# Patient Record
Sex: MALE | Race: WHITE | NOT HISPANIC OR LATINO
[De-identification: names, ages, dates, MRNs, and addresses within clinical notes are randomized per-mention and may not be internally consistent; named-entity substitution may affect disease eponyms.]

---

## 2020-01-04 ENCOUNTER — TRANSCRIPTION ENCOUNTER (OUTPATIENT)
Age: 45
End: 2020-01-04

## 2021-04-12 ENCOUNTER — INPATIENT (INPATIENT)
Facility: HOSPITAL | Age: 46
LOS: 3 days | Discharge: HOME HEALTH | DRG: 433 | End: 2021-04-16
Attending: STUDENT IN AN ORGANIZED HEALTH CARE EDUCATION/TRAINING PROGRAM
Payer: COMMERCIAL

## 2021-04-12 VITALS
DIASTOLIC BLOOD PRESSURE: 56 MMHG | SYSTOLIC BLOOD PRESSURE: 120 MMHG | TEMPERATURE: 99 F | RESPIRATION RATE: 22 BRPM | OXYGEN SATURATION: 94 % | HEART RATE: 92 BPM

## 2021-04-12 LAB
ALBUMIN FLD-MCNC: <0.2 G/DL — SIGNIFICANT CHANGE UP
ALBUMIN SERPL ELPH-MCNC: 2.4 G/DL — LOW (ref 3.4–5)
ALBUMIN SERPL ELPH-MCNC: 2.6 G/DL — LOW (ref 3.3–5)
ALP SERPL-CCNC: 152 U/L — HIGH (ref 40–120)
ALP SERPL-CCNC: 158 U/L — HIGH (ref 40–120)
ALT FLD-CCNC: 101 U/L — HIGH (ref 12–42)
ALT FLD-CCNC: 82 U/L — HIGH (ref 10–45)
AMMONIA BLD-MCNC: 65 UMOL/L — HIGH (ref 11–32)
AMPHET UR-MCNC: NEGATIVE — SIGNIFICANT CHANGE UP
AMYLASE P1 CFR SERPL: 46 U/L — SIGNIFICANT CHANGE UP (ref 25–115)
ANION GAP SERPL CALC-SCNC: 13 MMOL/L — SIGNIFICANT CHANGE UP (ref 5–17)
ANION GAP SERPL CALC-SCNC: 7 MMOL/L — LOW (ref 9–16)
ANISOCYTOSIS BLD QL: SLIGHT — SIGNIFICANT CHANGE UP
APAP SERPL-MCNC: <2 UG/ML — LOW (ref 10–30)
APPEARANCE UR: CLEAR — SIGNIFICANT CHANGE UP
APTT BLD: 39.2 SEC — HIGH (ref 27.5–35.5)
APTT BLD: 39.3 SEC — HIGH (ref 27.5–35.5)
AST SERPL-CCNC: 228 U/L — HIGH (ref 10–40)
AST SERPL-CCNC: 249 U/L — HIGH (ref 15–37)
B PERT IGG+IGM PNL SER: SIGNIFICANT CHANGE UP
BACTERIA # UR AUTO: PRESENT /HPF
BARBITURATES UR SCN-MCNC: NEGATIVE — SIGNIFICANT CHANGE UP
BASOPHILS # BLD AUTO: 0 K/UL — SIGNIFICANT CHANGE UP (ref 0–0.2)
BASOPHILS NFR BLD AUTO: 0 % — SIGNIFICANT CHANGE UP (ref 0–2)
BENZODIAZ UR-MCNC: NEGATIVE — SIGNIFICANT CHANGE UP
BILIRUB DIRECT SERPL-MCNC: 6.9 MG/DL — HIGH (ref 0–0.2)
BILIRUB SERPL-MCNC: 10.8 MG/DL — HIGH (ref 0.2–1.2)
BILIRUB SERPL-MCNC: 11 MG/DL — HIGH (ref 0.2–1.2)
BILIRUB UR-MCNC: ABNORMAL
BLD GP AB SCN SERPL QL: NEGATIVE — SIGNIFICANT CHANGE UP
BLD GP AB SCN SERPL QL: NEGATIVE — SIGNIFICANT CHANGE UP
BUN SERPL-MCNC: 11 MG/DL — SIGNIFICANT CHANGE UP (ref 7–23)
BUN SERPL-MCNC: 11 MG/DL — SIGNIFICANT CHANGE UP (ref 7–23)
CALCIUM SERPL-MCNC: 8.2 MG/DL — LOW (ref 8.4–10.5)
CALCIUM SERPL-MCNC: 8.3 MG/DL — LOW (ref 8.5–10.5)
CHLORIDE SERPL-SCNC: 87 MMOL/L — LOW (ref 96–108)
CHLORIDE SERPL-SCNC: 90 MMOL/L — LOW (ref 96–108)
CK SERPL-CCNC: 336 U/L — HIGH (ref 39–308)
CO2 SERPL-SCNC: 21 MMOL/L — LOW (ref 22–31)
CO2 SERPL-SCNC: 25 MMOL/L — SIGNIFICANT CHANGE UP (ref 22–31)
COCAINE METAB.OTHER UR-MCNC: NEGATIVE — SIGNIFICANT CHANGE UP
COLOR FLD: YELLOW — SIGNIFICANT CHANGE UP
COLOR SPEC: YELLOW — SIGNIFICANT CHANGE UP
COMMENT - FLUIDS: SIGNIFICANT CHANGE UP
CREAT ?TM UR-MCNC: 246 MG/DL — SIGNIFICANT CHANGE UP
CREAT SERPL-MCNC: 0.67 MG/DL — SIGNIFICANT CHANGE UP (ref 0.5–1.3)
CREAT SERPL-MCNC: 0.93 MG/DL — SIGNIFICANT CHANGE UP (ref 0.5–1.3)
DIFF PNL FLD: NEGATIVE — SIGNIFICANT CHANGE UP
EOSINOPHIL # BLD AUTO: 0 K/UL — SIGNIFICANT CHANGE UP (ref 0–0.5)
EOSINOPHIL NFR BLD AUTO: 0 % — SIGNIFICANT CHANGE UP (ref 0–6)
EPI CELLS # UR: SIGNIFICANT CHANGE UP /HPF (ref 0–5)
ETHANOL SERPL-MCNC: 228 MG/DL — HIGH
FLUID INTAKE SUBSTANCE CLASS: SIGNIFICANT CHANGE UP
FLUID SEGMENTED GRANULOCYTES: 10 % — SIGNIFICANT CHANGE UP
GLUCOSE BLDC GLUCOMTR-MCNC: 109 MG/DL — HIGH (ref 70–99)
GLUCOSE BLDC GLUCOMTR-MCNC: 94 MG/DL — SIGNIFICANT CHANGE UP (ref 70–99)
GLUCOSE SERPL-MCNC: 110 MG/DL — HIGH (ref 70–99)
GLUCOSE SERPL-MCNC: 124 MG/DL — HIGH (ref 70–99)
GLUCOSE UR QL: NEGATIVE — SIGNIFICANT CHANGE UP
GRAM STN FLD: SIGNIFICANT CHANGE UP
HCT VFR BLD CALC: 28.2 % — LOW (ref 39–50)
HCT VFR BLD CALC: 31.3 % — LOW (ref 39–50)
HCT VFR BLD CALC: 33 % — LOW (ref 39–50)
HGB BLD-MCNC: 10.2 G/DL — LOW (ref 13–17)
HGB BLD-MCNC: 11.2 G/DL — LOW (ref 13–17)
HGB BLD-MCNC: 11.9 G/DL — LOW (ref 13–17)
HYPOCHROMIA BLD QL: SLIGHT — SIGNIFICANT CHANGE UP
INR BLD: 1.93 — HIGH (ref 0.88–1.16)
INR BLD: 2.03 — HIGH (ref 0.88–1.16)
KETONES UR-MCNC: ABNORMAL MG/DL
LACTATE SERPL-SCNC: 3.5 MMOL/L — HIGH (ref 0.5–2)
LACTATE SERPL-SCNC: 4.7 MMOL/L — CRITICAL HIGH (ref 0.5–2)
LACTATE SERPL-SCNC: 6.3 MMOL/L — CRITICAL HIGH (ref 0.4–2)
LDH SERPL L TO P-CCNC: 46 U/L — SIGNIFICANT CHANGE UP
LDH SERPL L TO P-CCNC: 603 U/L — HIGH (ref 50–242)
LEUKOCYTE ESTERASE UR-ACNC: ABNORMAL
LIDOCAIN IGE QN: 418 U/L — HIGH (ref 73–393)
LYMPHOCYTES # BLD AUTO: 0.3 K/UL — LOW (ref 1–3.3)
LYMPHOCYTES # BLD AUTO: 1.8 % — LOW (ref 13–44)
LYMPHOCYTES # FLD: 29 % — SIGNIFICANT CHANGE UP
MACROCYTES BLD QL: SLIGHT — SIGNIFICANT CHANGE UP
MANUAL SMEAR VERIFICATION: SIGNIFICANT CHANGE UP
MCHC RBC-ENTMCNC: 32.8 PG — SIGNIFICANT CHANGE UP (ref 27–34)
MCHC RBC-ENTMCNC: 32.9 PG — SIGNIFICANT CHANGE UP (ref 27–34)
MCHC RBC-ENTMCNC: 33 PG — SIGNIFICANT CHANGE UP (ref 27–34)
MCHC RBC-ENTMCNC: 35.8 GM/DL — SIGNIFICANT CHANGE UP (ref 32–36)
MCHC RBC-ENTMCNC: 36.1 GM/DL — HIGH (ref 32–36)
MCHC RBC-ENTMCNC: 36.2 GM/DL — HIGH (ref 32–36)
MCV RBC AUTO: 90.7 FL — SIGNIFICANT CHANGE UP (ref 80–100)
MCV RBC AUTO: 91.4 FL — SIGNIFICANT CHANGE UP (ref 80–100)
MCV RBC AUTO: 92.1 FL — SIGNIFICANT CHANGE UP (ref 80–100)
MESOTHL CELL # FLD: 7 % — SIGNIFICANT CHANGE UP
METHADONE UR-MCNC: NEGATIVE — SIGNIFICANT CHANGE UP
MONOCYTES # BLD AUTO: 1.31 K/UL — HIGH (ref 0–0.9)
MONOCYTES NFR BLD AUTO: 7.9 % — SIGNIFICANT CHANGE UP (ref 2–14)
MONOS+MACROS # FLD: 27 % — SIGNIFICANT CHANGE UP
NEUTROPHILS # BLD AUTO: 14.92 K/UL — HIGH (ref 1.8–7.4)
NEUTROPHILS NFR BLD AUTO: 87.7 % — HIGH (ref 43–77)
NEUTS BAND # BLD: 2.6 % — SIGNIFICANT CHANGE UP (ref 0–8)
NITRITE UR-MCNC: POSITIVE
NRBC # BLD: 0 /100 WBCS — SIGNIFICANT CHANGE UP (ref 0–0)
NRBC # BLD: 0 /100 WBCS — SIGNIFICANT CHANGE UP (ref 0–0)
NT-PROBNP SERPL-SCNC: 48 PG/ML — SIGNIFICANT CHANGE UP
OB PNL STL: POSITIVE
OPIATES UR-MCNC: NEGATIVE — SIGNIFICANT CHANGE UP
OSMOLALITY UR: 588 MOSM/KG — SIGNIFICANT CHANGE UP (ref 300–900)
OVALOCYTES BLD QL SMEAR: SLIGHT — SIGNIFICANT CHANGE UP
PCP SPEC-MCNC: SIGNIFICANT CHANGE UP
PCP UR-MCNC: NEGATIVE — SIGNIFICANT CHANGE UP
PH UR: 6 — SIGNIFICANT CHANGE UP (ref 5–8)
PLAT MORPH BLD: NORMAL — SIGNIFICANT CHANGE UP
PLATELET # BLD AUTO: 102 K/UL — LOW (ref 150–400)
PLATELET # BLD AUTO: 131 K/UL — LOW (ref 150–400)
PLATELET # BLD AUTO: 141 K/UL — LOW (ref 150–400)
POLYCHROMASIA BLD QL SMEAR: SLIGHT — SIGNIFICANT CHANGE UP
POTASSIUM SERPL-MCNC: 4.3 MMOL/L — SIGNIFICANT CHANGE UP (ref 3.5–5.3)
POTASSIUM SERPL-MCNC: 4.4 MMOL/L — SIGNIFICANT CHANGE UP (ref 3.5–5.3)
POTASSIUM SERPL-SCNC: 4.3 MMOL/L — SIGNIFICANT CHANGE UP (ref 3.5–5.3)
POTASSIUM SERPL-SCNC: 4.4 MMOL/L — SIGNIFICANT CHANGE UP (ref 3.5–5.3)
PROT FLD-MCNC: 0.5 G/DL — SIGNIFICANT CHANGE UP
PROT SERPL-MCNC: 5.9 G/DL — LOW (ref 6–8.3)
PROT SERPL-MCNC: 6.4 G/DL — SIGNIFICANT CHANGE UP (ref 6.4–8.2)
PROT UR-MCNC: ABNORMAL MG/DL
PROTHROM AB SERPL-ACNC: 22.4 SEC — HIGH (ref 10.6–13.6)
PROTHROM AB SERPL-ACNC: 23.5 SEC — HIGH (ref 10.6–13.6)
RBC # BLD: 3.11 M/UL — LOW (ref 4.2–5.8)
RBC # BLD: 3.4 M/UL — LOW (ref 4.2–5.8)
RBC # BLD: 3.61 M/UL — LOW (ref 4.2–5.8)
RBC # FLD: 20.1 % — HIGH (ref 10.3–14.5)
RBC # FLD: 20.2 % — HIGH (ref 10.3–14.5)
RBC # FLD: 20.4 % — HIGH (ref 10.3–14.5)
RBC BLD AUTO: ABNORMAL
RCV VOL RI: 2000 /UL — HIGH (ref 0–0)
RH IG SCN BLD-IMP: POSITIVE — SIGNIFICANT CHANGE UP
RH IG SCN BLD-IMP: POSITIVE — SIGNIFICANT CHANGE UP
SALICYLATES SERPL-MCNC: <0.2 MG/DL — LOW (ref 2.8–20)
SARS-COV-2 RNA SPEC QL NAA+PROBE: SIGNIFICANT CHANGE UP
SMUDGE CELLS # BLD: PRESENT — SIGNIFICANT CHANGE UP
SODIUM SERPL-SCNC: 121 MMOL/L — LOW (ref 135–145)
SODIUM SERPL-SCNC: 122 MMOL/L — LOW (ref 132–145)
SODIUM UR-SCNC: 20 MMOL/L — SIGNIFICANT CHANGE UP
SP GR SPEC: 1.02 — SIGNIFICANT CHANGE UP (ref 1–1.03)
SPECIMEN SOURCE FLD: SIGNIFICANT CHANGE UP
SPECIMEN SOURCE: SIGNIFICANT CHANGE UP
TARGETS BLD QL SMEAR: SIGNIFICANT CHANGE UP
THC UR QL: NEGATIVE — SIGNIFICANT CHANGE UP
TOTAL NUCLEATED CELL COUNT, BODY FLUID: 73 /UL — SIGNIFICANT CHANGE UP
TROPONIN I SERPL-MCNC: <0.017 NG/ML — LOW (ref 0.02–0.06)
TUBE TYPE: SIGNIFICANT CHANGE UP
UROBILINOGEN FLD QL: 1 E.U./DL — SIGNIFICANT CHANGE UP
WBC # BLD: 14.16 K/UL — HIGH (ref 3.8–10.5)
WBC # BLD: 16.52 K/UL — HIGH (ref 3.8–10.5)
WBC # BLD: 17.35 K/UL — HIGH (ref 3.8–10.5)
WBC # FLD AUTO: 14.16 K/UL — HIGH (ref 3.8–10.5)
WBC # FLD AUTO: 16.52 K/UL — HIGH (ref 3.8–10.5)
WBC # FLD AUTO: 17.35 K/UL — HIGH (ref 3.8–10.5)
WBC UR QL: ABNORMAL /HPF

## 2021-04-12 PROCEDURE — 93010 ELECTROCARDIOGRAM REPORT: CPT

## 2021-04-12 PROCEDURE — 99222 1ST HOSP IP/OBS MODERATE 55: CPT

## 2021-04-12 PROCEDURE — 99291 CRITICAL CARE FIRST HOUR: CPT | Mod: 25

## 2021-04-12 PROCEDURE — 49082 ABD PARACENTESIS: CPT | Mod: GC

## 2021-04-12 PROCEDURE — 74176 CT ABD & PELVIS W/O CONTRAST: CPT | Mod: 26

## 2021-04-12 PROCEDURE — 99223 1ST HOSP IP/OBS HIGH 75: CPT

## 2021-04-12 PROCEDURE — 99223 1ST HOSP IP/OBS HIGH 75: CPT | Mod: 25

## 2021-04-12 PROCEDURE — 71250 CT THORAX DX C-: CPT | Mod: 26

## 2021-04-12 RX ORDER — DEXTROSE 50 % IN WATER 50 %
25 SYRINGE (ML) INTRAVENOUS ONCE
Refills: 0 | Status: DISCONTINUED | OUTPATIENT
Start: 2021-04-12 | End: 2021-04-12

## 2021-04-12 RX ORDER — SODIUM CHLORIDE 9 MG/ML
1000 INJECTION, SOLUTION INTRAVENOUS
Refills: 0 | Status: DISCONTINUED | OUTPATIENT
Start: 2021-04-12 | End: 2021-04-12

## 2021-04-12 RX ORDER — FOLIC ACID 0.8 MG
1 TABLET ORAL DAILY
Refills: 0 | Status: DISCONTINUED | OUTPATIENT
Start: 2021-04-12 | End: 2021-04-16

## 2021-04-12 RX ORDER — CHLORHEXIDINE GLUCONATE 213 G/1000ML
1 SOLUTION TOPICAL
Refills: 0 | Status: DISCONTINUED | OUTPATIENT
Start: 2021-04-12 | End: 2021-04-16

## 2021-04-12 RX ORDER — ZOLPIDEM TARTRATE 10 MG/1
5 TABLET ORAL AT BEDTIME
Refills: 0 | Status: DISCONTINUED | OUTPATIENT
Start: 2021-04-12 | End: 2021-04-13

## 2021-04-12 RX ORDER — PHYTONADIONE (VIT K1) 5 MG
10 TABLET ORAL EVERY 24 HOURS
Refills: 0 | Status: COMPLETED | OUTPATIENT
Start: 2021-04-12 | End: 2021-04-14

## 2021-04-12 RX ORDER — PREDNISOLONE 5 MG
40 TABLET ORAL DAILY
Refills: 0 | Status: DISCONTINUED | OUTPATIENT
Start: 2021-04-12 | End: 2021-04-12

## 2021-04-12 RX ORDER — CEFTRIAXONE 500 MG/1
2000 INJECTION, POWDER, FOR SOLUTION INTRAMUSCULAR; INTRAVENOUS EVERY 24 HOURS
Refills: 0 | Status: DISCONTINUED | OUTPATIENT
Start: 2021-04-13 | End: 2021-04-13

## 2021-04-12 RX ORDER — THIAMINE MONONITRATE (VIT B1) 100 MG
500 TABLET ORAL EVERY 8 HOURS
Refills: 0 | Status: DISCONTINUED | OUTPATIENT
Start: 2021-04-12 | End: 2021-04-13

## 2021-04-12 RX ORDER — SERTRALINE 25 MG/1
1 TABLET, FILM COATED ORAL
Qty: 0 | Refills: 0 | DISCHARGE

## 2021-04-12 RX ORDER — PANTOPRAZOLE SODIUM 20 MG/1
40 TABLET, DELAYED RELEASE ORAL
Refills: 0 | Status: DISCONTINUED | OUTPATIENT
Start: 2021-04-12 | End: 2021-04-16

## 2021-04-12 RX ORDER — SERTRALINE 25 MG/1
100 TABLET, FILM COATED ORAL DAILY
Refills: 0 | Status: DISCONTINUED | OUTPATIENT
Start: 2021-04-12 | End: 2021-04-16

## 2021-04-12 RX ORDER — THIAMINE MONONITRATE (VIT B1) 100 MG
500 TABLET ORAL EVERY 8 HOURS
Refills: 0 | Status: DISCONTINUED | OUTPATIENT
Start: 2021-04-12 | End: 2021-04-12

## 2021-04-12 RX ORDER — GLUCAGON INJECTION, SOLUTION 0.5 MG/.1ML
1 INJECTION, SOLUTION SUBCUTANEOUS ONCE
Refills: 0 | Status: DISCONTINUED | OUTPATIENT
Start: 2021-04-12 | End: 2021-04-12

## 2021-04-12 RX ORDER — ALBUMIN HUMAN 25 %
100 VIAL (ML) INTRAVENOUS ONCE
Refills: 0 | Status: COMPLETED | OUTPATIENT
Start: 2021-04-12 | End: 2021-04-12

## 2021-04-12 RX ORDER — PHYTONADIONE (VIT K1) 5 MG
5 TABLET ORAL ONCE
Refills: 0 | Status: COMPLETED | OUTPATIENT
Start: 2021-04-12 | End: 2021-04-12

## 2021-04-12 RX ORDER — LANOLIN ALCOHOL/MO/W.PET/CERES
5 CREAM (GRAM) TOPICAL AT BEDTIME
Refills: 0 | Status: DISCONTINUED | OUTPATIENT
Start: 2021-04-12 | End: 2021-04-12

## 2021-04-12 RX ORDER — DEXTROSE 50 % IN WATER 50 %
15 SYRINGE (ML) INTRAVENOUS ONCE
Refills: 0 | Status: DISCONTINUED | OUTPATIENT
Start: 2021-04-12 | End: 2021-04-12

## 2021-04-12 RX ORDER — DEXTROSE 50 % IN WATER 50 %
12.5 SYRINGE (ML) INTRAVENOUS ONCE
Refills: 0 | Status: DISCONTINUED | OUTPATIENT
Start: 2021-04-12 | End: 2021-04-12

## 2021-04-12 RX ORDER — INSULIN LISPRO 100/ML
VIAL (ML) SUBCUTANEOUS
Refills: 0 | Status: DISCONTINUED | OUTPATIENT
Start: 2021-04-12 | End: 2021-04-12

## 2021-04-12 RX ORDER — PROPRANOLOL HCL 160 MG
1 CAPSULE, EXTENDED RELEASE 24HR ORAL
Qty: 0 | Refills: 0 | DISCHARGE

## 2021-04-12 RX ORDER — CEFTRIAXONE 500 MG/1
2000 INJECTION, POWDER, FOR SOLUTION INTRAMUSCULAR; INTRAVENOUS ONCE
Refills: 0 | Status: COMPLETED | OUTPATIENT
Start: 2021-04-12 | End: 2021-04-12

## 2021-04-12 RX ADMIN — Medication 50 MILLILITER(S): at 18:49

## 2021-04-12 RX ADMIN — Medication 105 MILLIGRAM(S): at 13:04

## 2021-04-12 RX ADMIN — Medication 5 MILLIGRAM(S): at 13:04

## 2021-04-12 RX ADMIN — CEFTRIAXONE 100 MILLIGRAM(S): 500 INJECTION, POWDER, FOR SOLUTION INTRAMUSCULAR; INTRAVENOUS at 05:30

## 2021-04-12 RX ADMIN — Medication 5 MILLIGRAM(S): at 21:24

## 2021-04-12 RX ADMIN — Medication 1 TABLET(S): at 13:05

## 2021-04-12 RX ADMIN — PANTOPRAZOLE SODIUM 40 MILLIGRAM(S): 20 TABLET, DELAYED RELEASE ORAL at 13:06

## 2021-04-12 RX ADMIN — Medication 1 MILLIGRAM(S): at 12:39

## 2021-04-12 RX ADMIN — Medication 105 MILLIGRAM(S): at 21:24

## 2021-04-12 NOTE — ED PROVIDER NOTE - CPE EDP RESP NORM
Anesthesia Evaluation     . Pt has had prior anesthetic.            ROS/MED HX    ENT/Pulmonary:     (+)tobacco use, Past use , . .    Neurologic: Comment: Central retinal artery occlusion, right  Bilateral hearing loss - neg neurologic ROS     Cardiovascular:     (+) Dyslipidemia, ----. Taking blood thinners : . . . :. . Previous cardiac testing Echodate:[id number]:Interpretation Summary     Technically difficult imaging.  A cardiac source of embolus was not identiifed.  The left ventricle is normal in structure, function and size.  The visual ejection fraction is estimated at 60-65%.  The right ventricle is normal in structure, function and size.  Doppler interrogation does not demonstrate signifcant stenosis or  insufficiency involving cardiac valves.     No old studies for comparison.date: results:ECG reviewed date:6-27-13 results:Sinus Rhythm   WITHIN NORMAL LIMITS date: results:          METS/Exercise Tolerance:     Hematologic:  - neg hematologic  ROS       Musculoskeletal:         GI/Hepatic:     (+) GERD       Renal/Genitourinary:  - ROS Renal section negative       Endo:  - neg endo ROS       Psychiatric:  - neg psychiatric ROS       Infectious Disease:  - neg infectious disease ROS       Malignancy:   (+) Malignancy History of Breast          Other: Comment: cataract   - neg other ROS                 Physical Exam  Normal systems: cardiovascular, pulmonary and dental    Airway   Mallampati: II  TM distance: >3 FB  Neck ROM: full    Dental     Cardiovascular       Pulmonary                     Anesthesia Plan      History & Physical Review  History and physical reviewed and following examination; no interval change.    ASA Status:  2 .    NPO Status:  > 8 hours    Plan for MAC Reason for MAC:  Procedure to face, neck, head or breast  PONV prophylaxis:  Ondansetron (or other 5HT-3) and Dexamethasone or Solumedrol       Postoperative Care  Postoperative pain management:  IV analgesics and Oral pain  medications.      Consents  Anesthetic plan, risks, benefits and alternatives discussed with:  Patient..                          .   - - -

## 2021-04-12 NOTE — H&P ADULT - ASSESSMENT
46yo M PMHx alcohol abuse, anxiety, depression, HLD, MICHELLE (on CPAP), and herniated disks presents for___ and admitted for___    Neuro:    Cardio:    Pulm:    GI:    Renal:    Endo:    MSK:    Heme:    ID:    Diet:  DVT proph:  GI proph:  Lines:  Code Status:   44yo M PMHx alcohol abuse, anxiety, depression, HLD, MICHELLE (on CPAP), and herniated disks presents for bloody stool X1 month and distended abdomen, found to have hepatic steatosis and large volume ascites on CT abdomen likely 2/2 alcohol abuse pending diagnostic and therapeutic paracentesis.      Neuro:    Cardio:    Pulm:    GI:    Renal:    Endo:    MSK:    Heme:    ID:    Diet:  DVT proph:  GI proph:  Lines:  Code Status:   46yo M PMHx alcohol abuse, anxiety, depression, HLD, MICHELLE (on CPAP), and herniated disks presents for bloody stool X1 month and distended abdomen likely 2/2 alcoholic hepatitis pending diagnostic and therapeutic paracentesis.      Neuro:  -no active issues    Cardio:  #HLD  Not on any home meds.  BMI 44.9.      Pulm:    GI:    Renal:    Endo:    MSK:    Heme:    ID:    Diet:  DVT proph:  GI proph:  Lines:  Code Status:   44yo M PMHx alcohol abuse, anxiety, depression, HLD, MICHELLE (on CPAP), and herniated disks presents for bloody stool X1 month and distended abdomen likely 2/2 alcoholic hepatitis pending diagnostic and therapeutic paracentesis.      Neuro:  -no active issues    Cardio:  #HLD  Not on any home meds.  BMI 44.9.  -lipid panel      Pulm:  #MICHELLE  Uses CPAP at home at bedtime, although does not know settings.    -obtain collateral from  about home setting  -CPAP while inpatient    GI:  #Alcoholic hepatitis  Patient with excessive alcohol use and increasing quantities since pandemic began.  Last drink was 4/11 at 9pm.  Supporting clinical factors include jaundice and scleral icterus upon exam, AST//101, prolonged INR, elevated T bili (11.0), and leukocytosis.  CT abd with identification of large volume abdominal ascites.  -diagnostic and therapeutic paracentesis  -f/u GI recs    #Hepatic steatosis  Likely 2/2 alcohol abuse  -Identified on CT Abd    Renal:    Endo:    MSK:    Heme:    ID:  #R/O SBP  Patient with large volume abdominal ascites with associated abdominal pain and leukocytosis.   -s/p 2g CTX   -diagnostic paracentesis    Psych:  #Alcohol Abuse disorder  -CIWA protocol   -Ativan 1mg for CIWA >7  -monitor for signs and symptoms of withdrawal    #Anxiety  On propranolol 20mg BID  -hold to avoid masking underlying tachycardia i/s/o GIB    #Depression  On zoloft 100mg QD  -c/w home med    Diet: NPO, pending Paracentesis  DVT proph: none, i/s/o GIB  GI proph: Protonix 40mg QD  Lines: peripheral  Code Status: Full Code   44yo M PMHx alcohol abuse, anxiety, depression, HLD, MICHELLE (on CPAP), and herniated disks presents for bloody stool X1 month and distended abdomen likely 2/2 alcoholic hepatitis pending diagnostic and therapeutic paracentesis.      Neuro:  -no active issues    Cardio:  #HLD  Not on any home meds.  BMI 44.9.  -lipid panel      Pulm:  #MICHELLE  Uses CPAP at home at bedtime, although does not know settings.    -obtain collateral from  about home setting  -CPAP while inpatient    GI:  #Alcoholic hepatitis  Patient with excessive alcohol use and increasing quantities since pandemic began.  Supporting clinical factors include jaundice and scleral icterus upon exam, AST//101, prolonged INR, elevated T bili (11.0), and leukocytosis.    -CT abd with identification of large volume abdominal ascites.  -therapeutic paracentesis  -f/u GI recs    #Hepatic steatosis  Likely 2/2 alcohol abuse  -Identified on CT Abd    #Suspected cirrhosis  New onset abdominal distention i/s/o significant alcohol use.  CT abdomen results as follows: Hepatic steatosis. Mild splenomegaly. Abdominal varicose veins. Large ascites. Mesenteric edema. Extensive subcutaneous edema in the anterior and chest wall.   -RUQ U/S    #R/O hepatic vein thrombosis  -as above    MELD  MADRI    Renal:  #Lactic acidosis    #Hyponatremia    Endo:  -no active issues    MSK:  #Herniated disks  -prn pain control    Heme:  #Leukocytosis    #Anemia    #Elevated INR    ID:  #R/O SBP  Patient with large volume abdominal ascites with associated abdominal pain and leukocytosis.   -s/p 2g CTX   -diagnostic paracentesis    Psych:  #Alcohol Abuse disorder  Last drink was 4/11 at 9pm  -CIWA protocol   -Ativan 1mg for CIWA >7  -monitor for signs and symptoms of withdrawal    #Anxiety  On propranolol 20mg BID  -hold to avoid masking underlying tachycardia i/s/o GIB    #Depression  On zoloft 100mg QD  -c/w home med    Diet: NPO, pending Paracentesis  DVT proph: none, i/s/o GIB  GI proph: Protonix 40mg QD  Lines: peripheral  Code Status: Full Code   44yo M PMHx alcohol abuse, anxiety, depression, HLD, MICHELLE (on CPAP), and herniated disks presents for bloody stool X1 month and distended abdomen likely 2/2 alcoholic hepatitis pending diagnostic and therapeutic paracentesis.      Neuro:  -no active issues    Cardio:  #HLD  Not on any home meds.  BMI 44.9.  -lipid panel      Pulm:  #MICHELLE  Uses CPAP at home at bedtime, although does not know settings.    -obtain collateral from  about home setting  -CPAP while inpatient    GI:  #Alcoholic hepatitis  Patient with excessive alcohol use and increasing quantities since pandemic began.  Supporting clinical factors include jaundice and scleral icterus upon exam, AST//101, prolonged INR, elevated T bili (11.0), and leukocytosis.  JHON elevated.  CT abd with identification of large volume abdominal ascites.  -therapeutic paracentesis  -Tara's Discriminant Function 58.8 points, so patient may benefit from glucocorticoid therapy (SBP must first be ruled out)  -f/u GI recs    #Rectal bleeding  Likely from portal pressure 2/2 underlying liver disease.  Patient reports many bloody BMs with increasing frequency over the past month.  Patient has remained HD stable since admission.  FOBT+.  Repeat CBC stable between 11-12.    -hold DVT prophylaxis  -monitor CBC  -maintain active T&S  -transfuse if Hgb <7 or HD unstable    #Hepatic steatosis  Likely 2/2 alcohol abuse  -Identified on CT Abd    #Suspected cirrhosis  New onset abdominal distention i/s/o significant alcohol use.  CT abdomen results as follows: Hepatic steatosis. Mild splenomegaly. Abdominal varicose veins. Large ascites. Mesenteric edema. Extensive subcutaneous edema in the anterior and chest wall.    -30 point MELD score with 52.6% estimated 3-month mortality  -RUQ U/S    #R/O hepatic vein thrombosis  -as above    Renal:  #Lactic acidosis  Although patient may have SBP, suspicion for underlying infection remains low.  Elevated lactate upon admission likely 2/2 poor liver function and adequate clearance.  Lactate downtrending from 6.3-->4.7.  -trend to clearance    #Hyponatremia  Unclear etiology  -urine Na, Cr, Osm  -UA    Endo:  -no active issues    MSK:  #Herniated disks  -prn pain control    Heme:  #Leukocytosis  Likely 2/2 Alcoholic hepatitis vs. SBP  -remains afebrile  -CT chest w/o signs of lung pathology and patient w/o cough or clinical signs of respiratory infection  -UA  -ctm    #Anemia  Likely 2/2 underlying liver disfunction  -ctm    #Thrombocytopenia  Likely 2/2 underlying liver disfunction  -ctm    #Elevated INR  Likely 2/2 underlying liver disfunction  -ctm    ID:  #R/O SBP  Patient with large volume abdominal ascites with associated abdominal pain and leukocytosis.   -s/p 2g CTX   -diagnostic paracentesis    Psych:  #Alcohol Abuse disorder  Last drink was 4/11 at 9pm  -CIWA protocol   -Ativan 1mg for CIWA >7  -monitor for signs and symptoms of withdrawal    #Anxiety  On propranolol 20mg BID  -hold to avoid masking underlying tachycardia i/s/o GIB    #Depression  On zoloft 100mg QD  -c/w home med    Diet: NPO, pending Paracentesis  DVT proph: none, i/s/o GIB  GI proph: Protonix 40mg QD  Lines: peripheral  Code Status: Full Code   44yo M PMHx alcohol abuse, anxiety, depression, HLD, MICHELLE (on CPAP), and herniated disks presents for bloody stool X1 month and distended abdomen likely 2/2 alcoholic hepatitis pending diagnostic and therapeutic paracentesis.      Neuro:  -no active issues    Cardio:  #HLD  Not on any home meds.  BMI 44.9.  -lipid panel      Pulm:  #MICHELLE  Uses CPAP at home at bedtime, although does not know settings.    -obtain collateral from  about home setting  -CPAP while inpatient    GI:  #Alcoholic hepatitis  Patient with excessive alcohol use and increasing quantities since pandemic began.  Supporting clinical factors include jaundice and scleral icterus upon exam, AST//101, prolonged INR, elevated T bili (11.0), and leukocytosis.  JHON elevated.  CT abd with identification of large volume abdominal ascites.  -therapeutic paracentesis  -Tara's Discriminant Function 58.8 points, so patient may benefit from glucocorticoid therapy (SBP must first be ruled out)  -f/u GI recs    #Rectal bleeding  Likely from portal pressure 2/2 underlying liver disease.  Patient reports many bloody BMs with increasing frequency over the past month.  Patient has remained HD stable since admission.  FOBT+.  Repeat CBC stable between 11-12.    -hold DVT prophylaxis  -monitor CBC  -maintain active T&S  -transfuse if Hgb <7 or HD unstable    #Hepatic steatosis  Likely 2/2 alcohol abuse  -Identified on CT Abd    #Suspected cirrhosis  New onset abdominal distention i/s/o significant alcohol use.  CT abdomen results as follows: Hepatic steatosis. Mild splenomegaly. Abdominal varicose veins. Large ascites. Mesenteric edema. Extensive subcutaneous edema in the anterior and chest wall.    -30 point MELD score with 52.6% estimated 3-month mortality  -RUQ U/S    #R/O hepatic vein thrombosis  -as above    Renal:  #Lactic acidosis  Although patient may have SBP, suspicion for underlying infection remains low.  Elevated lactate upon admission likely 2/2 poor liver function and adequate clearance.  Lactate downtrending from 6.3-->4.7.  -trend to clearance    #Hyponatremia  Unclear etiology  -urine Na, Cr, Osm  -UA    Endo:  -no active issues    MSK:  #Herniated disks  -prn pain control    Heme:  #Leukocytosis  Likely 2/2 Alcoholic hepatitis vs. SBP  -remains afebrile  -CT chest w/o signs of lung pathology and patient w/o cough or clinical signs of respiratory infection  -UA  -ctm    #Anemia  Likely 2/2 underlying liver disfunction  -ctm    #Thrombocytopenia  Likely 2/2 underlying liver disfunction  -ctm    #Elevated INR  Likely 2/2 underlying liver disfunction  -ctm    ID:  #R/O SBP  Patient with large volume abdominal ascites with associated abdominal pain and leukocytosis.   -s/p 2g CTX   -diagnostic paracentesis    Psych:  #Alcohol Abuse disorder  Last drink was 4/11 at 9pm.  Patient declining offer for psychiatric counseling during this admission.  -CIWA protocol   -Ativan 1mg for CIWA >7  -monitor for signs and symptoms of withdrawal    #Anxiety  On propranolol 20mg BID  -hold to avoid masking underlying tachycardia i/s/o GIB    #Depression  On zoloft 100mg QD  -c/w home med    Diet: NPO, pending Paracentesis  DVT proph: none, i/s/o GIB  GI proph: Protonix 40mg QD  Lines: peripheral  Code Status: Full Code

## 2021-04-12 NOTE — H&P ADULT - HISTORY OF PRESENT ILLNESS
46yo M PMHx alcohol abuse, anxiety, depression, HLD, MICHELLE (on CPAP), and herniated disks presents for    In the ED:  Initial vital signs: T: XX F, HR: XX, BP: XX, R: XX, SpO2: XX% on RA  ED course:   Labs: significant for  Imaging:  CXR:   EKG:   Medications:   Consults: none  44yo M PMHx alcohol abuse, anxiety, depression, HLD, MICHELLE (on CPAP), and herniated disks presents for bloody stool X1 month.      In the ED:  Initial vital signs: T: XX F, HR: XX, BP: XX, R: XX, SpO2: XX% on RA  ED course:   Labs: significant for  Imaging:  CXR:   EKG:   Medications:   Consults: none  44yo M PMHx alcohol abuse, anxiety, depression, HLD, MICHELLE (on CPAP), and herniated disks presents for bloody stool X1 month.  About one month ago, patient started noticing he was having increasing frequency of bowel movements, many of which were mixed with blood.  Over the past few days, it has worsened, and he has been waking up every 2 hours in the middle of the night with bloody stools.  Last night, the patient was drinking alcohol and had another bloody bowel movement, some of which ended up on the floor because he couldn't make it to the bathroom.  When he bent down to clean up the mess, he became lightheaded and his  called 911, who brought him to [facility].  Although lightheaded, patient denies having any loss of consciousness or hitting his head.  Regarding his alcohol consumption, patient reports that he has been drinking more since the beginning of the pandemic, although unable to quantify amount during the interview.  He reports his last drink to be last night at 9 pm.  Upon ROS, patient states that he has had increasing shortness of breath, abdominal distension, generalized abdominal discomfort, and nausea with retching (no elijah vomitus or blood) over the past few weeks.  He has also had swelling of his legs for the past week, which he has never experienced previously.  He states "he has been spiraling" lately and has recently started therapy with a nurse practitioner who prescribed him zoloft and propranolol for anxiety and depression.  He has never been hospitalized for alcohol intoxication or withdrawal.  He denies having experienced any withdrawal seizures or hallucinations, although does have morning tremors that resolve with alcohol consumption.  He denies a history of hemorrhoids or any abdominal disorders/diseases.  He denies any recent sick contacts and received his first covid-19 vaccination (Moderna) earlier this month.  He does occasionally take advil for headaches and back pain, although no more than 2 soft gels for a maximum of 4 days straight.  Also, patient has been on propanolol for anxiety.    In the ED:  Initial vital signs: T: 98.9 F, HR: 92, BP: 120/56, R: 22, SpO2: 94% on RA  ED course:   Labs: significant for WBC 17.35, Hgb 11.9, Plt 141, PT/INR 22.4/1.93, PTT 39.3, Ammonia 65, Na 122, T bili 11.0, Alk Phos 158, AST//101, Lactate 6.3, Lipase 418, , Troponin I 0.017, JHON 228, FOBT +  Imaging:   CT Chest/Abd: Hepatic steatosis. Mild splenomegaly. Abdominal varicose veins. Large ascites. Mesenteric edema. Extensive subcutaneous edema in the anterior and chest wall.  EKG: Normal Sinus Rhythm   Medications: Ceftriaxone 2g IV   Consults: none

## 2021-04-12 NOTE — ED PROVIDER NOTE - CRITICAL CARE ATTENDING CONTRIBUTION TO CARE
I have seen and examined this patient in bed 11 of [facility] and discussed the case with the accepting MICU physician at [facility].    Swollen abdomen/ascites with jaundice, icteric sclera, frequent alcohol use, blood in stools for approx a week.  Patient has WBC 17K and elevated INR and hemoccult positive with elevated lactic acid.  Case d/w Dr Goss of MICU at [place] and recommends holding IV fluids at this time since fluids would third space and to hold on diagnostic and therapeutic paracentesis until albumin and possibly FFP can be administered in the ICU setting but to give 2g ceftriaxone in case this is SBP and transport time to ICU might be prolonged. Patient aware and will be admitted to ICU at [facility].  Patient does not have a primary care doctor.

## 2021-04-12 NOTE — ED ADULT NURSE REASSESSMENT NOTE - NS ED NURSE REASSESS COMMENT FT1
Pt resting in bed at this time with no complaints. Pt O2 found to be 9% on room air. Pt states that he normally sleeps with CPAP machine at home. Pt l placed on 2L NC with O2 to 94%. Pt pending transfer to [facility]. WIll continue to monitor.

## 2021-04-12 NOTE — CHART NOTE - NSCHARTNOTEFT_GEN_A_CORE
TRANSFER FROM  TO [facility]:    HOSPITAL COURSE:  44yo M PMHx alcohol abuse, anxiety, depression, HLD, MICHELLE (on CPAP), and herniated disks presents for bloody stool X1 month and distended abdomen likely 2/2 alcoholic hepatitis.  Now s/p diagnostic and therapeutic paracentesis with removal of ~4.7L ascitic fluid.  Patient now HD with improvement in sob and decrease in abdominal distention.  Labs with stable Hgb and downtrending lactate.  F/u plan as per H&P.  Patient is appropriate for stepdown. TRANSFER FROM  TO [facility]:    HOSPITAL COURSE:  46yo M PMHx alcohol abuse, anxiety, depression, HLD, MICHELLE (on CPAP), and herniated disks presents for bloody stool X1 month and distended abdomen likely 2/2 alcoholic hepatitis.  Now s/p diagnostic and therapeutic paracentesis with removal of ~4.7L ascitic fluid.  Patient now HD with improvement in sob and decrease in abdominal distention.  Lactate downtrending, although Hgb decreasing and needs close monitoring.  Patient with no active signs of bleeding on exam.  F/u plan as per H&P.  Patient is appropriate for stepdown.

## 2021-04-12 NOTE — H&P ADULT - NSHPLABSRESULTS_GEN_ALL_CORE
11.2   16.52 )-----------( 131      ( 12 Apr 2021 11:33 )             31.3       04-12    121<L>  |  87<L>  |  11  ----------------------------<  110<H>  4.4   |  21<L>  |  0.67    Ca    8.2<L>      12 Apr 2021 11:33    TPro  5.9<L>  /  Alb  2.6<L>  /  TBili  10.8<H>  /  DBili  x   /  AST  228<H>  /  ALT  82<H>  /  AlkPhos  152<H>  04-12                  PT/INR - ( 12 Apr 2021 11:33 )   PT: 23.5 sec;   INR: 2.03          PTT - ( 12 Apr 2021 11:33 )  PTT:39.2 sec    Lactate Trend  04-12 @ 11:33 Lactate:4.7   04-12 @ 04:18 Lactate:6.3       CARDIAC MARKERS ( 12 Apr 2021 04:18 )  <0.017 ng/mL / x     / 336 U/L / x     / x            CAPILLARY BLOOD GLUCOSE      POCT Blood Glucose.: 109 mg/dL (12 Apr 2021 11:34)

## 2021-04-12 NOTE — H&P ADULT - NSICDXFAMILYHX_GEN_ALL_CORE_FT
FAMILY HISTORY:  FH: CAD (coronary artery disease)  FH: depression    Father  Still living? Unknown  Family history of liver transplant, Age at diagnosis: Age Unknown  FH: lung cancer, Age at diagnosis: Age Unknown

## 2021-04-12 NOTE — H&P ADULT - NSHPSOCIALHISTORY_GEN_ALL_CORE
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

## 2021-04-12 NOTE — ED PROVIDER NOTE - CLINICAL SUMMARY MEDICAL DECISION MAKING FREE TEXT BOX
Swollen abdomen/ascites with jaundice, icteric sclera, frequent alcohol use, blood in stools for approx a week.  Patient has WBC 17K and elevated INR and hemoccult positive with elevated lactic acid.  Case d/w Dr Goss of MICU at [place] and recommends holding IV fluids at this time since fluids would third space and to hold on diagnostic and therapeutic paracentesis until albumin and possibly FFP can be administered in the ICU setting but to give 2g ceftriaxone in case this is SBP and transport time to ICU might be prolonged. Patient aware and will be admitted to ICU at [facility].  Patient does not have a primary care doctor.

## 2021-04-12 NOTE — CONSULT NOTE ADULT - ATTENDING COMMENTS
46yo M PMHx alcohol abuse,  for bloody stool X1 month. GI consulted for c/f alc hep.     #R/o alcoholic hepatitis  - would begin by r/o infectious etiologies w/ blood cx, CXR, UA, and diagnostic paracentesis  - no imaging e/o cirrhosis, though significant steatosis and inflammation can obfuscate this picture  - if cirrhotic, MELD-Na 30   - if alc hep, DF approximately 64, so if infectious w/u negative, would begin prednisolone  - MVI, thiamine, folic acid  - EtOH cessation counseling provided  - f/u diagnostic and therapeutic paracentesis; replete w/ albumin  - will determine diuretics in coming days  - vitamin K challenge 10 mg IV daily x3 days  - no s/s hepatic encephalopathy at this time so no role for lactulose/rifaximin  - obtain primary liver disease laboratory w/u: Acute Hepatitis Panel, antinuclear antibody, antismooth muscle antibody, anti-liver/kidney microsomal antibody type 1, immunoglobulin level, EBV serologies, CMV IgM, HSV IgM, Ceruloplasmin Level  - Check CBC, Coags, CMP daily  - Supportive care per primary team  - monitor for w/drawal symptoms      R/O LGIB, will monito, no E/O active bleed may need outpt [facility]

## 2021-04-12 NOTE — ED ADULT TRIAGE NOTE - CHIEF COMPLAINT QUOTE
pt's partner Naman called 911 as pt fell and couldn't get up, partner states has been falling a lot over the past 3 weeks, nil loc, didn't hit head, pt is jaundiced +, both pt and Naman state they hadnt noticed they thought it was the light in the flat. Pt has distended abdo for 3 weeks with blood in stool and occasional vomiting , last vomited yesterday, , gcs 15

## 2021-04-12 NOTE — ED PROVIDER NOTE - RELIEVING FACTORS
Strep Throat in Children   AMBULATORY CARE:   Strep throat  is a throat infection caused by bacteria  It is easily spread from person to person  Common symptoms include the following:   · Sore, red, and swollen throat    · Fever and headache    · Upset stomach, abdominal pain, or vomiting    · White or yellow patches or blisters in the back of the throat    · Throat pain when he or she swallows    · Tender, swollen lumps on the sides of the neck or jaw       Call 911 for any of the following:   · Your child has trouble breathing  Seek immediate care if:   · Your child's signs and symptoms continue for more than 5 to 7 days  · Your child is tugging at his or her ears or has ear pain  · Your child is drooling because he or she cannot swallow their spit  · Your child has blue lips or fingernails  Contact your child's healthcare provider if:   · Your child has a fever  · Your child has a rash that is itchy or swollen  · Your child's signs and symptoms get worse or do not get better, even after medicine  · You have questions or concerns about your child's condition or care  Treatment for strep throat:   · Antibiotics  treat a bacterial infection  Your child should feel better within 2 to 3 days after antibiotics are started  Give your child his antibiotics until they are gone, unless your child's healthcare provider says to stop them  Your child may return to school 24 hours after he starts antibiotic medicine  · Acetaminophen  decreases pain and fever  It is available without a doctor's order  Ask how much to give your child and how often to give it  Follow directions  Acetaminophen can cause liver damage if not taken correctly  · NSAIDs , such as ibuprofen, help decrease swelling, pain, and fever  This medicine is available with or without a doctor's order  NSAIDs can cause stomach bleeding or kidney problems in certain people   If your child takes blood thinner medicine, always ask if NSAIDs are safe for him  Always read the medicine label and follow directions  Do not give these medicines to children under 10months of age without direction from your child's healthcare provider  · Do not give aspirin to children under 25years of age  Your child could develop Reye syndrome if he takes aspirin  Reye syndrome can cause life-threatening brain and liver damage  Check your child's medicine labels for aspirin, salicylates, or oil of wintergreen  · Give your child's medicine as directed  Contact your child's healthcare provider if you think the medicine is not working as expected  Tell him or her if your child is allergic to any medicine  Keep a current list of the medicines, vitamins, and herbs your child takes  Include the amounts, and when, how, and why they are taken  Bring the list or the medicines in their containers to follow-up visits  Carry your child's medicine list with you in case of an emergency  Manage your child's symptoms:   · Give your child throat lozenges or hard candy to suck on  Lozenges and hard candy can help decrease throat pain  Do not give lozenges or hard candy to children under 4 years  · Give your child plenty of liquids  Liquids will help soothe your child's throat  Ask your child's healthcare provider how much liquid to give your child each day  Give your child warm or frozen liquids  Warm liquids include hot chocolate, sweetened tea, or soups  Frozen liquids include ice pops  Do not give your child acidic drinks such as orange juice, grapefruit juice, or lemonade  Acidic drinks can make your child's throat pain worse  · Have your child gargle with salt water  If your child can gargle, give him or her ¼ of a teaspoon of salt mixed with 1 cup of warm water  Tell your child to gargle for 10 to 15 seconds  Your child can repeat this up to 4 times each day  · Use a cool mist humidifier in your child's bedroom    A cool mist humidifier increases moisture in the air  This may decrease dryness and pain in your child's throat  Prevent the spread of strep throat:   · Wash your and your child's hands often  Use soap and water or an alcohol-based hand rub  · Do not let your child share food or drinks  Replace your child's toothbrush after he has taken antibiotics for 24 hours  Follow up with your child's healthcare provider as directed:  Write down your questions so you remember to ask them during your child's visits  © 2017 [address] [employer] Information is for End User's use only and may not be sold, redistributed or otherwise used for commercial purposes  All illustrations and images included in CareNotes® are the copyrighted property of [employer]  or [facility]  The above information is an  only  It is not intended as medical advice for individual conditions or treatments  Talk to your doctor, nurse or pharmacist before following any medical regimen to see if it is safe and effective for you  none

## 2021-04-12 NOTE — H&P ADULT - NSHPPHYSICALEXAM_GEN_ALL_CORE
VITALS:   T(C): 37.1 (04-12-21 @ 10:07), Max: 37.2 (04-12-21 @ 03:58)  HR: 100 (04-12-21 @ 11:00) (88 - 110)  BP: 140/71 (04-12-21 @ 11:00) (112/68 - 140/71)  RR: 27 (04-12-21 @ 11:00) (18 - 27)  SpO2: 93% (04-12-21 @ 11:00) (89% - 96%)    GENERAL: NAD, lying in bed comfortably  HEAD:  Atraumatic, Normocephalic  EYES: EOMI, PERRLA, conjunctiva and sclera clear  ENT: Moist mucous membranes  NECK: Supple, No JVD  CHEST/LUNG: Clear to auscultation bilaterally; No rales, rhonchi, wheezing, or rubs. Unlabored respirations  HEART: Regular rate and rhythm; No murmurs, rubs, or gallops  ABDOMEN: BSx4; Soft, nontender, nondistended  EXTREMITIES:  2+ Peripheral Pulses, brisk capillary refill. No clubbing, cyanosis, or edema  NERVOUS SYSTEM:  A&Ox3, no focal deficits   SKIN: No rashes or lesions VITALS:   T(C): 37.1 (04-12-21 @ 10:07), Max: 37.2 (04-12-21 @ 03:58)  HR: 100 (04-12-21 @ 11:00) (88 - 110)  BP: 140/71 (04-12-21 @ 11:00) (112/68 - 140/71)  RR: 27 (04-12-21 @ 11:00) (18 - 27)  SpO2: 93% (04-12-21 @ 11:00) (89% - 96%)    GENERAL: NAD, lying in bed comfortably  HEAD:  Atraumatic, Normocephalic  EYES: EOMI, PERRLA, scleral icterus  ENT: Moist mucous membranes, yellowing under tongue  NECK: Supple  CHEST/LUNG: Clear to auscultation bilaterally; No rales, rhonchi, wheezing, or rubs. Unlabored respirations  HEART: Regular rate and rhythm; No murmurs, rubs, or gallops  ABDOMEN: BSx4; soft, nontender, significantly distended, no spider angiomas, no varicosities  EXTREMITIES:  2+ Peripheral Pulses, brisk capillary refill. significant pitting edema to knees b/l  NERVOUS SYSTEM:  A&Ox3, no focal deficits   SKIN: jaundice, red macules on abdomen with small excoriations

## 2021-04-12 NOTE — H&P ADULT - NSICDXPASTMEDICALHX_GEN_ALL_CORE_FT
PAST MEDICAL HISTORY:  Alcohol abuse     Anxiety     Depression     HLD (hyperlipidemia)     Lumbar disc herniation     MICHELLE on CPAP

## 2021-04-13 DIAGNOSIS — E87.1 HYPO-OSMOLALITY AND HYPONATREMIA: ICD-10-CM

## 2021-04-13 DIAGNOSIS — E87.2 ACIDOSIS: ICD-10-CM

## 2021-04-13 DIAGNOSIS — F10.10 ALCOHOL ABUSE, UNCOMPLICATED: ICD-10-CM

## 2021-04-13 DIAGNOSIS — F41.9 ANXIETY DISORDER, UNSPECIFIED: ICD-10-CM

## 2021-04-13 DIAGNOSIS — M51.26 OTHER INTERVERTEBRAL DISC DISPLACEMENT, LUMBAR REGION: ICD-10-CM

## 2021-04-13 DIAGNOSIS — K70.11 ALCOHOLIC HEPATITIS WITH ASCITES: ICD-10-CM

## 2021-04-13 DIAGNOSIS — K62.5 HEMORRHAGE OF ANUS AND RECTUM: ICD-10-CM

## 2021-04-13 DIAGNOSIS — G47.33 OBSTRUCTIVE SLEEP APNEA (ADULT) (PEDIATRIC): ICD-10-CM

## 2021-04-13 DIAGNOSIS — R63.8 OTHER SYMPTOMS AND SIGNS CONCERNING FOOD AND FLUID INTAKE: ICD-10-CM

## 2021-04-13 DIAGNOSIS — D72.829 ELEVATED WHITE BLOOD CELL COUNT, UNSPECIFIED: ICD-10-CM

## 2021-04-13 LAB
A1C WITH ESTIMATED AVERAGE GLUCOSE RESULT: 4.2 % — SIGNIFICANT CHANGE UP (ref 4–5.6)
AFP-TM SERPL-MCNC: 4.1 NG/ML — SIGNIFICANT CHANGE UP
ALBUMIN FLD-MCNC: <0.2 G/DL — SIGNIFICANT CHANGE UP
ALBUMIN SERPL ELPH-MCNC: 3.1 G/DL — LOW (ref 3.3–5)
ALP SERPL-CCNC: 158 U/L — HIGH (ref 40–120)
ALT FLD-CCNC: 74 U/L — HIGH (ref 10–45)
ANION GAP SERPL CALC-SCNC: 10 MMOL/L — SIGNIFICANT CHANGE UP (ref 5–17)
ANISOCYTOSIS BLD QL: SLIGHT — SIGNIFICANT CHANGE UP
APTT BLD: 38.6 SEC — HIGH (ref 27.5–35.5)
AST SERPL-CCNC: 200 U/L — HIGH (ref 10–40)
B PERT IGG+IGM PNL SER: SIGNIFICANT CHANGE UP
BASOPHILS # BLD AUTO: 0 K/UL — SIGNIFICANT CHANGE UP (ref 0–0.2)
BASOPHILS NFR BLD AUTO: 0 % — SIGNIFICANT CHANGE UP (ref 0–2)
BILIRUB SERPL-MCNC: 13.1 MG/DL — HIGH (ref 0.2–1.2)
BUN SERPL-MCNC: 8 MG/DL — SIGNIFICANT CHANGE UP (ref 7–23)
CALCIUM SERPL-MCNC: 7.9 MG/DL — LOW (ref 8.4–10.5)
CERULOPLASMIN SERPL-MCNC: 16 MG/DL — SIGNIFICANT CHANGE UP (ref 15–30)
CHLORIDE SERPL-SCNC: 89 MMOL/L — LOW (ref 96–108)
CHOLEST SERPL-MCNC: 139 MG/DL — SIGNIFICANT CHANGE UP
CMV IGM FLD-ACNC: <8 AU/ML — SIGNIFICANT CHANGE UP
CMV IGM SERPL QL: NEGATIVE — SIGNIFICANT CHANGE UP
CO2 SERPL-SCNC: 25 MMOL/L — SIGNIFICANT CHANGE UP (ref 22–31)
COLOR FLD: YELLOW — SIGNIFICANT CHANGE UP
COMMENT - FLUIDS: SIGNIFICANT CHANGE UP
CREAT SERPL-MCNC: 0.58 MG/DL — SIGNIFICANT CHANGE UP (ref 0.5–1.3)
EBV EA AB SER IA-ACNC: 12.2 U/ML — HIGH
EBV EA AB TITR SER IF: POSITIVE
EBV EA IGG SER-ACNC: POSITIVE
EBV NA IGG SER IA-ACNC: >600 U/ML — HIGH
EBV PATRN SPEC IB-IMP: SIGNIFICANT CHANGE UP
EBV VCA IGG AVIDITY SER QL IA: POSITIVE
EBV VCA IGM SER IA-ACNC: 94.9 U/ML — HIGH
EBV VCA IGM SER IA-ACNC: <10 U/ML — SIGNIFICANT CHANGE UP
EBV VCA IGM TITR FLD: NEGATIVE — SIGNIFICANT CHANGE UP
EOSINOPHIL # BLD AUTO: 0.08 K/UL — SIGNIFICANT CHANGE UP (ref 0–0.5)
EOSINOPHIL NFR BLD AUTO: 0.8 % — SIGNIFICANT CHANGE UP (ref 0–6)
ESTIMATED AVERAGE GLUCOSE: 74 MG/DL — SIGNIFICANT CHANGE UP (ref 68–114)
FLUID INTAKE SUBSTANCE CLASS: SIGNIFICANT CHANGE UP
FLUID SEGMENTED GRANULOCYTES: 6 % — SIGNIFICANT CHANGE UP
GIANT PLATELETS BLD QL SMEAR: PRESENT — SIGNIFICANT CHANGE UP
GLUCOSE BLDC GLUCOMTR-MCNC: 102 MG/DL — HIGH (ref 70–99)
GLUCOSE BLDC GLUCOMTR-MCNC: 130 MG/DL — HIGH (ref 70–99)
GLUCOSE FLD-MCNC: 119 MG/DL — SIGNIFICANT CHANGE UP
GLUCOSE SERPL-MCNC: 101 MG/DL — HIGH (ref 70–99)
GRAM STN FLD: SIGNIFICANT CHANGE UP
HAV IGM SER-ACNC: SIGNIFICANT CHANGE UP
HBV CORE IGM SER-ACNC: SIGNIFICANT CHANGE UP
HBV SURFACE AG SER-ACNC: SIGNIFICANT CHANGE UP
HCT VFR BLD CALC: 28.8 % — LOW (ref 39–50)
HCV AB S/CO SERPL IA: 0.09 S/CO — SIGNIFICANT CHANGE UP
HCV AB SERPL-IMP: SIGNIFICANT CHANGE UP
HDLC SERPL-MCNC: 15 MG/DL — LOW
HGB BLD-MCNC: 10.4 G/DL — LOW (ref 13–17)
HYPOCHROMIA BLD QL: SLIGHT — SIGNIFICANT CHANGE UP
IGA FLD-MCNC: 548 MG/DL — HIGH (ref 84–499)
IGG FLD-MCNC: 1494 MG/DL — SIGNIFICANT CHANGE UP (ref 610–1660)
IGM SERPL-MCNC: 122 MG/DL — SIGNIFICANT CHANGE UP (ref 35–242)
INR BLD: 1.92 — HIGH (ref 0.88–1.16)
KAPPA LC SER QL IFE: 2.56 MG/DL — HIGH (ref 0.33–1.94)
KAPPA/LAMBDA FREE LIGHT CHAIN RATIO, SERUM: 0.81 RATIO — SIGNIFICANT CHANGE UP (ref 0.26–1.65)
LAMBDA LC SER QL IFE: 3.15 MG/DL — HIGH (ref 0.57–2.63)
LDH SERPL L TO P-CCNC: 46 U/L — SIGNIFICANT CHANGE UP
LIPID PNL WITH DIRECT LDL SERPL: 99 MG/DL — SIGNIFICANT CHANGE UP
LYMPHOCYTES # BLD AUTO: 0.91 K/UL — LOW (ref 1–3.3)
LYMPHOCYTES # BLD AUTO: 8.7 % — LOW (ref 13–44)
LYMPHOCYTES # FLD: 37 % — SIGNIFICANT CHANGE UP
MACROCYTES BLD QL: SLIGHT — SIGNIFICANT CHANGE UP
MAGNESIUM SERPL-MCNC: 2 MG/DL — SIGNIFICANT CHANGE UP (ref 1.6–2.6)
MANUAL SMEAR VERIFICATION: SIGNIFICANT CHANGE UP
MCHC RBC-ENTMCNC: 32.4 PG — SIGNIFICANT CHANGE UP (ref 27–34)
MCHC RBC-ENTMCNC: 36.1 GM/DL — HIGH (ref 32–36)
MCV RBC AUTO: 89.7 FL — SIGNIFICANT CHANGE UP (ref 80–100)
MESOTHL CELL # FLD: 5 % — SIGNIFICANT CHANGE UP
MONOCYTES # BLD AUTO: 1.54 K/UL — HIGH (ref 0–0.9)
MONOCYTES NFR BLD AUTO: 14.8 % — HIGH (ref 2–14)
MONOS+MACROS # FLD: 9 % — SIGNIFICANT CHANGE UP
NEUTROPHILS # BLD AUTO: 7.89 K/UL — HIGH (ref 1.8–7.4)
NEUTROPHILS NFR BLD AUTO: 74.8 % — SIGNIFICANT CHANGE UP (ref 43–77)
NEUTS BAND # BLD: 0.9 % — SIGNIFICANT CHANGE UP (ref 0–8)
NON HDL CHOLESTEROL: 124 MG/DL — SIGNIFICANT CHANGE UP
OSMOLALITY SERPL: 259 MOSM/KG — LOW (ref 275–300)
OVALOCYTES BLD QL SMEAR: SLIGHT — SIGNIFICANT CHANGE UP
PHOSPHATE SERPL-MCNC: 2.6 MG/DL — SIGNIFICANT CHANGE UP (ref 2.5–4.5)
PLAT MORPH BLD: NORMAL — SIGNIFICANT CHANGE UP
PLATELET # BLD AUTO: 74 K/UL — LOW (ref 150–400)
POLYCHROMASIA BLD QL SMEAR: SLIGHT — SIGNIFICANT CHANGE UP
POTASSIUM SERPL-MCNC: 3.8 MMOL/L — SIGNIFICANT CHANGE UP (ref 3.5–5.3)
POTASSIUM SERPL-SCNC: 3.8 MMOL/L — SIGNIFICANT CHANGE UP (ref 3.5–5.3)
PROT FLD-MCNC: 0.5 G/DL — SIGNIFICANT CHANGE UP
PROT SERPL-MCNC: 5.5 G/DL — LOW (ref 6–8.3)
PROTHROM AB SERPL-ACNC: 22.3 SEC — HIGH (ref 10.6–13.6)
RBC # BLD: 3.21 M/UL — LOW (ref 4.2–5.8)
RBC # FLD: 20.6 % — HIGH (ref 10.3–14.5)
RBC BLD AUTO: ABNORMAL
RCV VOL RI: 2000 /UL — HIGH (ref 0–0)
SMUDGE CELLS # BLD: PRESENT — SIGNIFICANT CHANGE UP
SODIUM SERPL-SCNC: 124 MMOL/L — LOW (ref 135–145)
SPECIMEN SOURCE FLD: SIGNIFICANT CHANGE UP
SPECIMEN SOURCE: SIGNIFICANT CHANGE UP
TARGETS BLD QL SMEAR: SIGNIFICANT CHANGE UP
TOTAL NUCLEATED CELL COUNT, BODY FLUID: 57 /UL — SIGNIFICANT CHANGE UP
TRIGL SERPL-MCNC: 124 MG/DL — SIGNIFICANT CHANGE UP
TUBE TYPE: SIGNIFICANT CHANGE UP
WBC # BLD: 10.42 K/UL — SIGNIFICANT CHANGE UP (ref 3.8–10.5)
WBC # FLD AUTO: 10.42 K/UL — SIGNIFICANT CHANGE UP (ref 3.8–10.5)

## 2021-04-13 PROCEDURE — 99233 SBSQ HOSP IP/OBS HIGH 50: CPT

## 2021-04-13 PROCEDURE — 93010 ELECTROCARDIOGRAM REPORT: CPT

## 2021-04-13 PROCEDURE — 49083 ABD PARACENTESIS W/IMAGING: CPT

## 2021-04-13 PROCEDURE — 99232 SBSQ HOSP IP/OBS MODERATE 35: CPT

## 2021-04-13 PROCEDURE — 93975 VASCULAR STUDY: CPT | Mod: 26

## 2021-04-13 RX ORDER — ALBUMIN HUMAN 25 %
100 VIAL (ML) INTRAVENOUS ONCE
Refills: 0 | Status: COMPLETED | OUTPATIENT
Start: 2021-04-13 | End: 2021-04-13

## 2021-04-13 RX ORDER — ALBUMIN HUMAN 25 %
100 VIAL (ML) INTRAVENOUS
Refills: 0 | Status: COMPLETED | OUTPATIENT
Start: 2021-04-13 | End: 2021-04-13

## 2021-04-13 RX ORDER — PREDNISOLONE 5 MG
40 TABLET ORAL EVERY 24 HOURS
Refills: 0 | Status: DISCONTINUED | OUTPATIENT
Start: 2021-04-13 | End: 2021-04-16

## 2021-04-13 RX ORDER — THIAMINE MONONITRATE (VIT B1) 100 MG
100 TABLET ORAL EVERY 24 HOURS
Refills: 0 | Status: DISCONTINUED | OUTPATIENT
Start: 2021-04-13 | End: 2021-04-16

## 2021-04-13 RX ORDER — INSULIN LISPRO 100/ML
VIAL (ML) SUBCUTANEOUS
Refills: 0 | Status: DISCONTINUED | OUTPATIENT
Start: 2021-04-13 | End: 2021-04-16

## 2021-04-13 RX ADMIN — CHLORHEXIDINE GLUCONATE 1 APPLICATION(S): 213 SOLUTION TOPICAL at 07:00

## 2021-04-13 RX ADMIN — PANTOPRAZOLE SODIUM 40 MILLIGRAM(S): 20 TABLET, DELAYED RELEASE ORAL at 07:00

## 2021-04-13 RX ADMIN — Medication 40 MILLIGRAM(S): at 18:41

## 2021-04-13 RX ADMIN — Medication 102 MILLIGRAM(S): at 04:00

## 2021-04-13 RX ADMIN — Medication 102 MILLIGRAM(S): at 21:08

## 2021-04-13 RX ADMIN — Medication 1 MILLIGRAM(S): at 11:33

## 2021-04-13 RX ADMIN — Medication 50 MILLILITER(S): at 16:25

## 2021-04-13 RX ADMIN — Medication 50 MILLILITER(S): at 22:33

## 2021-04-13 RX ADMIN — SERTRALINE 100 MILLIGRAM(S): 25 TABLET, FILM COATED ORAL at 11:33

## 2021-04-13 RX ADMIN — Medication 105 MILLIGRAM(S): at 07:00

## 2021-04-13 RX ADMIN — CEFTRIAXONE 100 MILLIGRAM(S): 500 INJECTION, POWDER, FOR SOLUTION INTRAMUSCULAR; INTRAVENOUS at 07:00

## 2021-04-13 RX ADMIN — Medication 50 MILLILITER(S): at 16:27

## 2021-04-13 RX ADMIN — Medication 50 MILLILITER(S): at 20:34

## 2021-04-13 RX ADMIN — Medication 100 MILLIGRAM(S): at 11:33

## 2021-04-13 RX ADMIN — Medication 1 TABLET(S): at 11:33

## 2021-04-13 NOTE — PROGRESS NOTE ADULT - PROBLEM SELECTOR PLAN 7
Likely 2/2 Alcoholic hepatitis vs. SBP  -remains afebrile  -CT chest w/o signs of lung pathology and patient w/o cough or clinical signs of respiratory infection  -UA+ but asymptomatic  -need to send blood culture in AM    #Anemia/Thrombocytopenia/Elevated INR  Likely 2/2 underlying liver disfunction  -continue to monitor Likely 2/2 Alcoholic hepatitis vs. SBP  - no longer elevated WBC, currently 10.42  -remains afebrile  -CT chest w/o signs of lung pathology and patient w/o cough or clinical signs of respiratory infection  -UA+ but asymptomatic  -need to send blood culture in AM    #Anemia/Thrombocytopenia/Elevated INR  Likely 2/2 underlying liver disfunction  -continue to monitor

## 2021-04-13 NOTE — PROGRESS NOTE ADULT - SUBJECTIVE AND OBJECTIVE BOX
GASTROENTEROLOGY PROGRESS NOTE  Patient seen and examined at bedside. No acute events o/n. Feels like abdomen getting distended again. No pain.     PERTINENT REVIEW OF SYSTEMS:  CONSTITUTIONAL: No weakness, fevers or chills  HEENT: No visual changes; No vertigo or throat pain   GASTROINTESTINAL: As above.  NEUROLOGICAL: No numbness or weakness  SKIN: No itching, burning, rashes, or lesions     Allergies    No Known Allergies    Intolerances      MEDICATIONS:  MEDICATIONS  (STANDING):  albumin human 25% IVPB 100 milliLiter(s) IV Intermittent once  albumin human 25% IVPB 100 milliLiter(s) IV Intermittent once  chlorhexidine 4% Liquid 1 Application(s) Topical <User Schedule>  folic acid 1 milliGRAM(s) Oral daily  multivitamin 1 Tablet(s) Oral daily  pantoprazole    Tablet 40 milliGRAM(s) Oral before breakfast  phytonadione  IVPB 10 milliGRAM(s) IV Intermittent every 24 hours  propranolol 20 milliGRAM(s) Oral every 12 hours  sertraline 100 milliGRAM(s) Oral daily  thiamine 100 milliGRAM(s) Oral every 24 hours    MEDICATIONS  (PRN):  LORazepam   Injectable 1 milliGRAM(s) IV Push every 2 hours PRN CIWA-Ar score increase by 2 points and a total score of 7 or less    Vital Signs Last 24 Hrs  T(C): 37.3 (2021 06:00), Max: 37.3 (2021 06:00)  T(F): 99.1 (2021 06:00), Max: 99.1 (2021 06:00)  HR: 108 (2021 08:59) (95 - 125)  BP: 131/62 (2021 06:00) (131/62 - 158/74)  BP(mean): 105 (2021 21:15) (89 - 108)  RR: 18 (2021 08:59) (12 - 27)  SpO2: 98% (2021 08:59) (92% - 98%)     @ 07:01  -  -13 @ 07:00  --------------------------------------------------------  IN: 150 mL / OUT: 400 mL / NET: -250 mL      PHYSICAL EXAM:    General: Well developed; well nourished; in no acute distress  HEENT: MMM, conjunctiva and sclera clear  Gastrointestinal: Soft non-tender moderately distended; Normal bowel sounds; No rebound or guarding  Skin: Warm and dry. No obvious rash    LABS:                        10.4   10.42 )-----------( 74       ( 2021 08:28 )             28.8     04-13    124<L>  |  89<L>  |  8   ----------------------------<  101<H>  3.8   |  25  |  0.58    Ca    7.9<L>      2021 08:28  Phos  2.6     -  Mg     2.0         TPro  5.5<L>  /  Alb  3.1<L>  /  TBili  13.1<H>  /  DBili  x   /  AST  200<H>  /  ALT  74<H>  /  AlkPhos  158<H>  13    PT/INR - ( 2021 08:28 )   PT: 22.3 sec;   INR: 1.92          PTT - ( 2021 08:28 )  PTT:38.6 sec      Urinalysis Basic - ( 2021 18:53 )    Color: Yellow / Appearance: Clear / S.025 / pH: x  Gluc: x / Ketone: Trace mg/dL  / Bili: Large / Urobili: 1.0 E.U./dL   Blood: x / Protein: Trace mg/dL / Nitrite: POSITIVE   Leuk Esterase: Trace / RBC: x / WBC 5-10 /HPF   Sq Epi: x / Non Sq Epi: 0-5 /HPF / Bacteria: Present /HPF                Culture - Body Fluid with Gram Stain (collected 2021 17:05)  Source: Ascites Fl ascitic fluid  Gram Stain (2021 20:48):    No organisms seen    No WBC's seen.  Preliminary Report (2021 08:06):    No growth to date      RADIOLOGY & ADDITIONAL STUDIES:  Reviewed

## 2021-04-13 NOTE — PROGRESS NOTE ADULT - PROBLEM SELECTOR PLAN 1
Patient with excessive alcohol use and increasing quantities since pandemic began.  Supporting clinical factors include jaundice and scleral icterus upon exam, AST//101, prolonged INR, elevated T bili (11.0), and leukocytosis.  JOHN elevated.  CT abd with identification of large volume abdominal ascites.  - GI consulted, appreciate recs  - s/p therapeutic paracentesis 4/12 (abx given before para, so cannot r/o SBP based on PMN counts)  - c/w ceftriaxone for possible SBP  - hold off steroids until blood cx taken   - MVI, thiamine, folic acid  - vitamin K challenge 10 mg IV daily x3 days (4/12-4/15)  - f/u Acute Hepatitis Panel, antinuclear antibody, antismooth muscle antibody, anti-liver/kidney microsomal antibody type 1, immunoglobulin level, EBV serologies, CMV IgM, HSV IgM, Ceruloplasmin Level  - Check CBC, Coags, CMP daily Patient with excessive alcohol use and increasing quantities since pandemic began.  Supporting clinical factors include jaundice and scleral icterus upon exam, initial AST//101, prolonged INR, elevated T bili (11.0), and leukocytosis.  JHON elevated.  CT abd with identification of large volume abdominal ascites.  - GI consulted, appreciate recs  - s/p therapeutic paracentesis 4/12 (abx given before para, so cannot r/o SBP based on PMN counts). Will consult IR for another paracentesis.   - d/c ceftriaxone for possible SBP due to negative paracentesis and no signs of infxn  - hold off steroids until blood cx taken   - MVI, thiamine, folic acid  - vitamin K challenge 10 mg IV daily x3 days (4/12-4/15)  - f/u Acute Hepatitis Panel, antinuclear antibody, antismooth muscle antibody, anti-liver/kidney microsomal antibody type 1, immunoglobulin level, EBV serologies, CMV IgM, HSV IgM, Ceruloplasmin Level  - Check CBC, Coags, CMP daily Patient with excessive alcohol use and increasing quantities since pandemic began.  Supporting clinical factors include jaundice and scleral icterus upon exam, initial AST//101, prolonged INR, elevated T bili (11.0), and leukocytosis.  JHON elevated.  CT abd with identification of large volume abdominal ascites.  - GI consulted, appreciate recs  - s/p therapeutic paracentesis 4/12 (abx given before para, so cannot r/o SBP based on PMN counts). Will consult IR for another paracentesis.   - d/c ceftriaxone for possible SBP due to negative paracentesis and no signs of infxn  - hold off steroids until blood cx taken   - MVI, thiamine (decreased to 100mg starting tomorrow), folic acid  - vitamin K challenge 10 mg IV daily x3 days (4/12-4/15)  - f/u Acute Hepatitis Panel, antinuclear antibody, antismooth muscle antibody, anti-liver/kidney microsomal antibody type 1, immunoglobulin level, EBV serologies, CMV IgM, HSV IgM, Ceruloplasmin Level  - Check CBC, Coags, CMP daily

## 2021-04-13 NOTE — PROGRESS NOTE ADULT - ASSESSMENT
46yo M PMHx alcohol abuse, anxiety, depression, HLD, MICHELLE (on CPAP), and herniated disks presents for bloody stool X1 month. GI consulted for c/f alc hep.     #R/o alcoholic hepatitis  - no findings concerning for infectious etiologies, bilirubin continuing to uptrend, would begin prednisolone for alc hep  - no imaging e/o cirrhosis, though significant steatosis and inflammation can obfuscate this picture  - if cirrhotic, MELD-Na 30   - MVI, thiamine, folic acid  - EtOH cessation counseling provided  - paracentesis studies negative for SBP, consistent w/ portal HTN given low total protein and SAAG>1.1  - will determine diuretics in coming days following rpt therapeutic paracentesis  - vitamin K challenge 10 mg IV daily x3 days  - no s/s hepatic encephalopathy at this time so no role for lactulose/rifaximin  - f/u primary liver disease laboratory w/u: Acute Hepatitis Panel, antinuclear antibody, antismooth muscle antibody, anti-liver/kidney microsomal antibody type 1, immunoglobulin level, EBV serologies, CMV IgM, HSV IgM, Ceruloplasmin Level  - Check CBC, Coags, CMP daily  - Supportive care per primary team  - monitor for w/drawal symptoms    Jess Kurtz MD  PGY-4, Gastroenterology Fellow  pager: [phone]

## 2021-04-13 NOTE — PROGRESS NOTE ADULT - ASSESSMENT
44yo M PMHx alcohol abuse, anxiety, depression, HLD, MICHELLE (on CPAP), and herniated disks presents for bloody stool X1 month and distended abdomen likely 2/2 alcoholic hepatitis now s/p diagnostic and therapeutic paracentesis.

## 2021-04-13 NOTE — PROGRESS NOTE ADULT - PROBLEM SELECTOR PLAN 10
Diet: full liquid diet  DVT proph: none, i/s/o GIB  GI proph: Protonix 40mg QD  Lines: peripheral  Code Status: Full Code Diet: high protein diet  DVT proph: none, i/s/o GIB  GI proph: Protonix 40mg QD  Lines: peripheral  Code Status: Full Code Diet: regular diet with ensure   DVT proph: none, i/s/o GIB  GI proph: Protonix 40mg QD  Lines: peripheral  Code Status: Full Code

## 2021-04-13 NOTE — PROGRESS NOTE ADULT - PROBLEM SELECTOR PLAN 6
Hyponatremia likely hypervolemic hyponatremia  -continue to monitor  -urine Na, Cr, Osm  -UA Hyponatremia likely hypervolemic hyponatremia  -continue to monitor  -urine Na, Cr, Osm

## 2021-04-13 NOTE — PROGRESS NOTE ADULT - SUBJECTIVE AND OBJECTIVE BOX
************* TRANSFER ACCEPTANCE NOTE from [facility] to [facility] *************    HOSPITAL COURSE:  44yo M PMHx alcohol abuse, anxiety, depression, HLD, MICHELLE (on CPAP), and herniated disks presents for bloody stool X1 month and distended abdomen likely 2/2 alcoholic hepatitis.  Now s/p diagnostic and therapeutic paracentesis with removal of ~4.7L ascitic fluid.  Patient now HD with improvement in sob and decrease in abdominal distention.  Lactate downtrending, although Hgb decreasing and needs close monitoring.  Patient with no active signs of bleeding on exam. Patient is appropriate for stepdown.    SUBJECTIVE / INTERVAL HPI: Patient seen and examined at bedside. Pt in no acute distress, last BM in the evening was non-bloody.     ROS: 12-point review of system negative except mentioned above.     VITAL SIGNS:  Vital Signs Last 24 Hrs  T(C): 37.1 (2021 22:02), Max: 37.2 (2021 03:58)  T(F): 98.8 (2021 22:02), Max: 98.9 (2021 03:58)  HR: 96 (2021 22:02) (88 - 117)  BP: 150/63 (2021 22:02) (112/68 - 158/74)  BP(mean): 105 (2021 21:15) (89 - 108)  RR: 18 (2021 22:02) (12 - 27)  SpO2: 96% (2021 22:02) (89% - 96%)    PHYSICAL EXAM:    GENERAL: NAD, lying in bed comfortably  HEAD:  Atraumatic, Normocephalic  EYES: EOMI, PERRLA, scleral icterus  ENT: Moist mucous membranes, yellowing under tongue  NECK: Supple  CHEST/LUNG: Clear to auscultation bilaterally; No rales, rhonchi, wheezing, or rubs. Unlabored respirations  HEART: Regular rate and rhythm; No murmurs, rubs, or gallops  ABDOMEN: BSx4; soft, nontender, significantly distended, no spider angiomas, no varicosities  EXTREMITIES:  2+ Peripheral Pulses, brisk capillary refill. significant pitting edema to knees b/l  NERVOUS SYSTEM:  A&Ox3, no focal deficits   SKIN: jaundice, red macules on abdomen with small excoriations    MEDICATIONS:  MEDICATIONS  (STANDING):  cefTRIAXone   IVPB 2000 milliGRAM(s) IV Intermittent every 24 hours  chlorhexidine 4% Liquid 1 Application(s) Topical <User Schedule>  folic acid 1 milliGRAM(s) Oral daily  multivitamin 1 Tablet(s) Oral daily  pantoprazole    Tablet 40 milliGRAM(s) Oral before breakfast  phytonadione  IVPB 10 milliGRAM(s) IV Intermittent every 24 hours  sertraline 100 milliGRAM(s) Oral daily  thiamine IVPB 500 milliGRAM(s) IV Intermittent every 8 hours    MEDICATIONS  (PRN):  LORazepam   Injectable 1 milliGRAM(s) IV Push every 2 hours PRN CIWA-Ar score increase by 2 points and a total score of 7 or less  zolpidem 5 milliGRAM(s) Oral at bedtime PRN Insomnia      ALLERGIES:  Allergies    No Known Allergies    Intolerances        LABS:                        10.2   14.16 )-----------( 102      ( 2021 18:21 )             28.2     04-12    121<L>  |  87<L>  |  11  ----------------------------<  110<H>  4.4   |  21<L>  |  0.67    Ca    8.2<L>      2021 11:33    TPro  5.9<L>  /  Alb  2.6<L>  /  TBili  10.8<H>  /  DBili  6.9<H>  /  AST  228<H>  /  ALT  82<H>  /  AlkPhos  152<H>  04-12    PT/INR - ( 2021 11:33 )   PT: 23.5 sec;   INR: 2.03          PTT - ( 2021 11:33 )  PTT:39.2 sec  Urinalysis Basic - ( 2021 18:53 )    Color: Yellow / Appearance: Clear / S.025 / pH: x  Gluc: x / Ketone: Trace mg/dL  / Bili: Large / Urobili: 1.0 E.U./dL   Blood: x / Protein: Trace mg/dL / Nitrite: POSITIVE   Leuk Esterase: Trace / RBC: x / WBC 5-10 /HPF   Sq Epi: x / Non Sq Epi: 0-5 /HPF / Bacteria: Present /HPF      CAPILLARY BLOOD GLUCOSE      POCT Blood Glucose.: 94 mg/dL (2021 17:27)      RADIOLOGY & ADDITIONAL TESTS: Reviewed.

## 2021-04-13 NOTE — PROGRESS NOTE ADULT - PROBLEM SELECTOR PLAN 2
Likely from portal pressure 2/2 underlying liver disease.  Patient reports many bloody BMs with increasing frequency over the past month.  Patient has remained HD stable since admission.  FOBT+.  Repeat CBC stable between 10-12.    -hold DVT prophylaxis  -monitor CBC  -maintain active T&S  -transfuse if Hgb <7 or HD unstable

## 2021-04-13 NOTE — PROGRESS NOTE ADULT - PROBLEM SELECTOR PLAN 1
#Alcoholic hepatitis  Patient with excessive alcohol use and increasing quantities since pandemic began.  Supporting clinical factors include jaundice and scleral icterus upon exam, AST//101, prolonged INR, elevated T bili (11.0), and leukocytosis.  JHON elevated.  CT abd with identification of large volume abdominal ascites.  - GI consulted, appreciate recs  - s/p therapeutic paracentesis 4/12 (abx given before para, so cannot r/o SBP based on PMN counts)  - c/w ceftriaxone for possible SBP  - hold off steroids until infection is r/o  - MVI, thiamine, folic acid  - vitamin K challenge 10 mg IV daily x3 days (4/12-4/15)  - f/u Acute Hepatitis Panel, antinuclear antibody, antismooth muscle antibody, anti-liver/kidney microsomal antibody type 1, immunoglobulin level, EBV serologies, CMV IgM, HSV IgM, Ceruloplasmin Level  - Check CBC, Coags, CMP daily

## 2021-04-13 NOTE — PROGRESS NOTE ADULT - PROBLEM SELECTOR PLAN 10
Diet: full liquid diet  DVT proph: none, i/s/o GIB  GI proph: Protonix 40mg QD  Lines: peripheral  Code Status: Full Code

## 2021-04-13 NOTE — PROGRESS NOTE ADULT - PROBLEM SELECTOR PLAN 3
#Suspected cirrhosis  New onset abdominal distention i/s/o significant alcohol use.  CT abdomen results as follows: Hepatic steatosis. Mild splenomegaly. Abdominal varicose veins. Large ascites. Mesenteric edema. Extensive subcutaneous edema in the anterior and chest wall.    -30 point MELD score with 52.6% estimated 3-month mortality  -RUQ U/S    #R/O hepatic vein thrombosis  -f/u ab US    #Hepatic steatosis  Likely 2/2 alcohol abuse  -Identified on CT Abd

## 2021-04-13 NOTE — PROGRESS NOTE ADULT - PROBLEM SELECTOR PLAN 2
#Rectal bleeding  Likely from portal pressure 2/2 underlying liver disease.  Patient reports many bloody BMs with increasing frequency over the past month.  Patient has remained HD stable since admission.  FOBT+.  Repeat CBC stable between 10-12.    -hold DVT prophylaxis  -monitor CBC  -maintain active T&S  -transfuse if Hgb <7 or HD unstable

## 2021-04-13 NOTE — PROGRESS NOTE ADULT - PROBLEM SELECTOR PLAN 4
Last drink was 4/11 at 9pm.  Patient declining offer for psychiatric counseling during this admission.  -CIWA protocol   -Ativan 1mg for CIWA >7  -monitor for signs and symptoms of withdrawal

## 2021-04-13 NOTE — PROGRESS NOTE ADULT - ASSESSMENT
46yo M PMHx alcohol abuse, anxiety, depression, HLD, MICHELLE (on CPAP), and herniated disks presents for bloody stool X1 month and distended abdomen likely 2/2 alcoholic hepatitis now s/p diagnostic and therapeutic paracentesis.

## 2021-04-13 NOTE — PROGRESS NOTE ADULT - PROBLEM SELECTOR PLAN 8
Hx of anxiety on propranolol 20mg BID  -hold to avoid masking underlying tachycardia i/s/o GIB    #Depression  On zoloft 100mg QD  -c/w home med Hx of anxiety on propranolol 20mg BID  -restart propranolol; not concerned for GI bleed at this time     #Depression  On zoloft 100mg QD  -c/w home med

## 2021-04-13 NOTE — PROGRESS NOTE ADULT - PROBLEM SELECTOR PLAN 3
New onset abdominal distention i/s/o significant alcohol use.  CT abdomen results as follows: Hepatic steatosis. Mild splenomegaly. Abdominal varicose veins. Large ascites. Mesenteric edema. Extensive subcutaneous edema in the anterior and chest wall.    -30 point MELD score with 52.6% estimated 3-month mortality  -RUQ U/S    #R/O hepatic vein thrombosis  -f/u ab US    #Hepatic steatosis  Likely 2/2 alcohol abuse  -Identified on CT Abd

## 2021-04-13 NOTE — SBIRT NOTE ADULT - NSSBIRTBRIEFINTDET_GEN_A_CORE
Patient reported drinking 4-10 mixed drinks daily. Patient's alcohol intake has been increasing since COVID. Patient has never been to rehab before. SW offered patient resources; patient declined.

## 2021-04-13 NOTE — PROGRESS NOTE ADULT - PROBLEM SELECTOR PLAN 7
#Leukocytosis  Likely 2/2 Alcoholic hepatitis vs. SBP  -remains afebrile  -CT chest w/o signs of lung pathology and patient w/o cough or clinical signs of respiratory infection  -UA+ but asymptomatic  -need to send blood culture in AM    #Anemia/Thrombocytopenia/Elevated INR  Likely 2/2 underlying liver disfunction  -ctm

## 2021-04-13 NOTE — PROGRESS NOTE ADULT - PROBLEM SELECTOR PLAN 4
#Alcohol Abuse disorder  Last drink was 4/11 at 9pm.  Patient declining offer for psychiatric counseling during this admission.  -CIWA protocol   -Ativan 1mg for CIWA >7  -monitor for signs and symptoms of withdrawal    #Anxiety  On propranolol 20mg BID  -hold to avoid masking underlying tachycardia i/s/o GIB    #Depression  On zoloft 100mg QD  -c/w home med

## 2021-04-13 NOTE — PROGRESS NOTE ADULT - PROBLEM SELECTOR PLAN 5
Although patient may have SBP, suspicion for underlying infection remains low.  Elevated lactate upon admission likely 2/2 poor liver function and adequate clearance. Lactate downtrending from 6.3-->4.7-->3.5  -trend to clearance

## 2021-04-13 NOTE — PROGRESS NOTE ADULT - PROBLEM SELECTOR PLAN 5
#Lactic acidosis  Although patient may have SBP, suspicion for underlying infection remains low.  Elevated lactate upon admission likely 2/2 poor liver function and adequate clearance. Lactate downtrending from 6.3-->4.7-->3.5  -trend to clearance

## 2021-04-13 NOTE — PROGRESS NOTE ADULT - SUBJECTIVE AND OBJECTIVE BOX
CC: Patient is a 45y old  Male who presents with a chief complaint of bright red blood per rectum (2021 05:57)      OVERNIGHT EVENTS: none     SUBJECTIVE / INTERVAL HPI: Patient seen and examined at bedside. Patient states that he felt better after the paracentesis, but is starting to feel worsening abdominal distension. Otherwise has no complaints.     ROS: negative unless otherwise stated above.      VITAL SIGNS:  Vital Signs Last 24 Hrs  T(C): 37.3 (2021 06:00), Max: 37.3 (2021 06:00)  T(F): 99.1 (2021 06:00), Max: 99.1 (2021 06:00)  HR: 125 (2021 06:20) (95 - 125)  BP: 131/62 (2021 06:00) (129/81 - 158/74)  BP(mean): 105 (2021 21:15) (89 - 108)  RR: 20 (2021 06:20) (12 - 27)  SpO2: 97% (2021 06:20) (92% - 97%)    PHYSICAL EXAM:  GENERAL: NAD, lying in bed comfortably  HEAD:  Atraumatic, Normocephalic  EYES: EOMI, PERRLA, scleral icterus  NECK: Supple  CHEST/LUNG: Clear to auscultation bilaterally; No rales, rhonchi, wheezing, or rubs. Unlabored respirations  HEART: Regular rate and rhythm; No murmurs, rubs, or gallops  ABDOMEN: BSx4; nontender, significantly distended, no spider angiomas, no varicosities  EXTREMITIES:  2+ Peripheral Pulses, brisk capillary refill. Significant pitting edema to knees b/l  NERVOUS SYSTEM:  A&Ox3, no focal deficits     MEDICATIONS:  MEDICATIONS  (STANDING):  cefTRIAXone   IVPB 2000 milliGRAM(s) IV Intermittent every 24 hours  chlorhexidine 4% Liquid 1 Application(s) Topical <User Schedule>  folic acid 1 milliGRAM(s) Oral daily  multivitamin 1 Tablet(s) Oral daily  pantoprazole    Tablet 40 milliGRAM(s) Oral before breakfast  phytonadione  IVPB 10 milliGRAM(s) IV Intermittent every 24 hours  sertraline 100 milliGRAM(s) Oral daily  thiamine IVPB 500 milliGRAM(s) IV Intermittent every 8 hours    MEDICATIONS  (PRN):  LORazepam   Injectable 1 milliGRAM(s) IV Push every 2 hours PRN CIWA-Ar score increase by 2 points and a total score of 7 or less  zolpidem 5 milliGRAM(s) Oral at bedtime PRN Insomnia      ALLERGIES:  Allergies    No Known Allergies    Intolerances        LABS:                        10.4   10.42 )-----------( 74       ( 2021 08:28 )             28.8     04-13    x   |  x   |  8   ----------------------------<  x   x    |  25  |  0.58    Ca    8.2<L>      2021 11:33  Phos  2.6     04-13  Mg     2.0     04-13    TPro  5.9<L>  /  Alb  2.6<L>  /  TBili  10.8<H>  /  DBili  6.9<H>  /  AST  228<H>  /  ALT  82<H>  /  AlkPhos  152<H>  04-12    PT/INR - ( 2021 08:28 )   PT: 22.3 sec;   INR: 1.92          PTT - ( 2021 08:28 )  PTT:38.6 sec  Urinalysis Basic - ( 2021 18:53 )    Color: Yellow / Appearance: Clear / S.025 / pH: x  Gluc: x / Ketone: Trace mg/dL  / Bili: Large / Urobili: 1.0 E.U./dL   Blood: x / Protein: Trace mg/dL / Nitrite: POSITIVE   Leuk Esterase: Trace / RBC: x / WBC 5-10 /HPF   Sq Epi: x / Non Sq Epi: 0-5 /HPF / Bacteria: Present /HPF      CAPILLARY BLOOD GLUCOSE      POCT Blood Glucose.: 94 mg/dL (2021 17:27)      RADIOLOGY & ADDITIONAL TESTS: Reviewed.       CC: Patient is a 45y old  Male who presents with a chief complaint of bright red blood per rectum (2021 05:57)    OVERNIGHT EVENTS: none     SUBJECTIVE / INTERVAL HPI: Patient seen and examined at bedside. Patient states that he felt better after the paracentesis, but is starting to feel worsening abdominal distension. Otherwise has no complaints.     ROS: negative unless otherwise stated above.      VITAL SIGNS:  Vital Signs Last 24 Hrs  T(C): 37.3 (2021 06:00), Max: 37.3 (2021 06:00)  T(F): 99.1 (2021 06:00), Max: 99.1 (2021 06:00)  HR: 125 (2021 06:20) (95 - 125)  BP: 131/62 (2021 06:00) (129/81 - 158/74)  BP(mean): 105 (2021 21:15) (89 - 108)  RR: 20 (2021 06:20) (12 - 27)  SpO2: 97% (2021 06:20) (92% - 97%)    PHYSICAL EXAM:  GENERAL: NAD, lying in bed comfortably  HEAD:  Atraumatic, Normocephalic  EYES: EOMI, PERRLA, scleral icterus  NECK: Supple  CHEST/LUNG: Clear to auscultation bilaterally; No rales, rhonchi, wheezing, or rubs. Unlabored respirations  HEART: Regular rate and rhythm; No murmurs, rubs, or gallops  ABDOMEN: BSx4; nontender, significantly distended, no spider angiomas, no varicosities  EXTREMITIES:  2+ Peripheral Pulses, brisk capillary refill. Significant pitting edema to knees b/l  NERVOUS SYSTEM:  A&Ox3, no focal deficits     MEDICATIONS:  MEDICATIONS  (STANDING):  cefTRIAXone   IVPB 2000 milliGRAM(s) IV Intermittent every 24 hours  chlorhexidine 4% Liquid 1 Application(s) Topical <User Schedule>  folic acid 1 milliGRAM(s) Oral daily  multivitamin 1 Tablet(s) Oral daily  pantoprazole    Tablet 40 milliGRAM(s) Oral before breakfast  phytonadione  IVPB 10 milliGRAM(s) IV Intermittent every 24 hours  sertraline 100 milliGRAM(s) Oral daily  thiamine IVPB 500 milliGRAM(s) IV Intermittent every 8 hours    MEDICATIONS  (PRN):  LORazepam   Injectable 1 milliGRAM(s) IV Push every 2 hours PRN CIWA-Ar score increase by 2 points and a total score of 7 or less  zolpidem 5 milliGRAM(s) Oral at bedtime PRN Insomnia      ALLERGIES:  Allergies    No Known Allergies    Intolerances        LABS:                        10.4   10.42 )-----------( 74       ( 2021 08:28 )             28.8     04-13    x   |  x   |  8   ----------------------------<  x   x    |  25  |  0.58    Ca    8.2<L>      2021 11:33  Phos  2.6     04-13  Mg     2.0     04-13    TPro  5.9<L>  /  Alb  2.6<L>  /  TBili  10.8<H>  /  DBili  6.9<H>  /  AST  228<H>  /  ALT  82<H>  /  AlkPhos  152<H>  04-12    PT/INR - ( 2021 08:28 )   PT: 22.3 sec;   INR: 1.92          PTT - ( 2021 08:28 )  PTT:38.6 sec  Urinalysis Basic - ( 2021 18:53 )    Color: Yellow / Appearance: Clear / S.025 / pH: x  Gluc: x / Ketone: Trace mg/dL  / Bili: Large / Urobili: 1.0 E.U./dL   Blood: x / Protein: Trace mg/dL / Nitrite: POSITIVE   Leuk Esterase: Trace / RBC: x / WBC 5-10 /HPF   Sq Epi: x / Non Sq Epi: 0-5 /HPF / Bacteria: Present /HPF      CAPILLARY BLOOD GLUCOSE      POCT Blood Glucose.: 94 mg/dL (2021 17:27)      RADIOLOGY & ADDITIONAL TESTS: Reviewed.

## 2021-04-13 NOTE — PROGRESS NOTE ADULT - PROBLEM SELECTOR PLAN 8
#MICHELLE  Uses CPAP at home at bedtime, although does not know settings.    -obtain collateral from  about home setting  -CPAP while inpatient

## 2021-04-14 LAB
ALBUMIN SERPL ELPH-MCNC: 3.1 G/DL — LOW (ref 3.3–5)
ALP SERPL-CCNC: 95 U/L — SIGNIFICANT CHANGE UP (ref 40–120)
ALT FLD-CCNC: 54 U/L — HIGH (ref 10–45)
ANA TITR SER: NEGATIVE — SIGNIFICANT CHANGE UP
ANION GAP SERPL CALC-SCNC: 10 MMOL/L — SIGNIFICANT CHANGE UP (ref 5–17)
ANION GAP SERPL CALC-SCNC: 8 MMOL/L — SIGNIFICANT CHANGE UP (ref 5–17)
APTT BLD: 43.9 SEC — HIGH (ref 27.5–35.5)
AST SERPL-CCNC: 140 U/L — HIGH (ref 10–40)
BASOPHILS # BLD AUTO: 0.02 K/UL — SIGNIFICANT CHANGE UP (ref 0–0.2)
BASOPHILS NFR BLD AUTO: 0.3 % — SIGNIFICANT CHANGE UP (ref 0–2)
BILIRUB SERPL-MCNC: 12.4 MG/DL — HIGH (ref 0.2–1.2)
BUN SERPL-MCNC: 11 MG/DL — SIGNIFICANT CHANGE UP (ref 7–23)
BUN SERPL-MCNC: 9 MG/DL — SIGNIFICANT CHANGE UP (ref 7–23)
CALCIUM SERPL-MCNC: 8 MG/DL — LOW (ref 8.4–10.5)
CALCIUM SERPL-MCNC: 8.1 MG/DL — LOW (ref 8.4–10.5)
CHLORIDE SERPL-SCNC: 97 MMOL/L — SIGNIFICANT CHANGE UP (ref 96–108)
CHLORIDE SERPL-SCNC: 98 MMOL/L — SIGNIFICANT CHANGE UP (ref 96–108)
CO2 SERPL-SCNC: 27 MMOL/L — SIGNIFICANT CHANGE UP (ref 22–31)
CO2 SERPL-SCNC: 28 MMOL/L — SIGNIFICANT CHANGE UP (ref 22–31)
CREAT SERPL-MCNC: 0.57 MG/DL — SIGNIFICANT CHANGE UP (ref 0.5–1.3)
CREAT SERPL-MCNC: 0.68 MG/DL — SIGNIFICANT CHANGE UP (ref 0.5–1.3)
EOSINOPHIL # BLD AUTO: 0 K/UL — SIGNIFICANT CHANGE UP (ref 0–0.5)
EOSINOPHIL NFR BLD AUTO: 0 % — SIGNIFICANT CHANGE UP (ref 0–6)
GLUCOSE BLDC GLUCOMTR-MCNC: 100 MG/DL — HIGH (ref 70–99)
GLUCOSE BLDC GLUCOMTR-MCNC: 112 MG/DL — HIGH (ref 70–99)
GLUCOSE BLDC GLUCOMTR-MCNC: 121 MG/DL — HIGH (ref 70–99)
GLUCOSE BLDC GLUCOMTR-MCNC: 132 MG/DL — HIGH (ref 70–99)
GLUCOSE SERPL-MCNC: 117 MG/DL — HIGH (ref 70–99)
GLUCOSE SERPL-MCNC: 117 MG/DL — HIGH (ref 70–99)
HCT VFR BLD CALC: 24.8 % — LOW (ref 39–50)
HGB BLD-MCNC: 8.6 G/DL — LOW (ref 13–17)
IMM GRANULOCYTES NFR BLD AUTO: 0.9 % — SIGNIFICANT CHANGE UP (ref 0–1.5)
INR BLD: 2.22 — HIGH (ref 0.88–1.16)
LKM AB SER-ACNC: <20.1 UNITS — SIGNIFICANT CHANGE UP (ref 0–20)
LYMPHOCYTES # BLD AUTO: 0.56 K/UL — LOW (ref 1–3.3)
LYMPHOCYTES # BLD AUTO: 7.2 % — LOW (ref 13–44)
MAGNESIUM SERPL-MCNC: 2.2 MG/DL — SIGNIFICANT CHANGE UP (ref 1.6–2.6)
MCHC RBC-ENTMCNC: 32.2 PG — SIGNIFICANT CHANGE UP (ref 27–34)
MCHC RBC-ENTMCNC: 34.7 GM/DL — SIGNIFICANT CHANGE UP (ref 32–36)
MCV RBC AUTO: 92.9 FL — SIGNIFICANT CHANGE UP (ref 80–100)
MONOCYTES # BLD AUTO: 0.83 K/UL — SIGNIFICANT CHANGE UP (ref 0–0.9)
MONOCYTES NFR BLD AUTO: 10.7 % — SIGNIFICANT CHANGE UP (ref 2–14)
NEUTROPHILS # BLD AUTO: 6.25 K/UL — SIGNIFICANT CHANGE UP (ref 1.8–7.4)
NEUTROPHILS NFR BLD AUTO: 80.9 % — HIGH (ref 43–77)
NRBC # BLD: 0 /100 WBCS — SIGNIFICANT CHANGE UP (ref 0–0)
OSMOLALITY SERPL: 279 MOSM/KG — SIGNIFICANT CHANGE UP (ref 275–300)
OSMOLALITY SERPL: 312 MOSMOL/KG — HIGH (ref 275–300)
OSMOLALITY UR: 343 MOSM/KG — SIGNIFICANT CHANGE UP (ref 300–900)
PHOSPHATE SERPL-MCNC: 3 MG/DL — SIGNIFICANT CHANGE UP (ref 2.5–4.5)
PLATELET # BLD AUTO: 57 K/UL — LOW (ref 150–400)
POTASSIUM SERPL-MCNC: 3.7 MMOL/L — SIGNIFICANT CHANGE UP (ref 3.5–5.3)
POTASSIUM SERPL-MCNC: 3.8 MMOL/L — SIGNIFICANT CHANGE UP (ref 3.5–5.3)
POTASSIUM SERPL-SCNC: 3.7 MMOL/L — SIGNIFICANT CHANGE UP (ref 3.5–5.3)
POTASSIUM SERPL-SCNC: 3.8 MMOL/L — SIGNIFICANT CHANGE UP (ref 3.5–5.3)
PROT SERPL-MCNC: 5.2 G/DL — LOW (ref 6–8.3)
PROTHROM AB SERPL-ACNC: 25.6 SEC — HIGH (ref 10.6–13.6)
RBC # BLD: 2.67 M/UL — LOW (ref 4.2–5.8)
RBC # FLD: 21.4 % — HIGH (ref 10.3–14.5)
SMOOTH MUSCLE AB SER-ACNC: SIGNIFICANT CHANGE UP
SODIUM SERPL-SCNC: 134 MMOL/L — LOW (ref 135–145)
SODIUM SERPL-SCNC: 134 MMOL/L — LOW (ref 135–145)
SODIUM UR-SCNC: 20 MMOL/L — SIGNIFICANT CHANGE UP
WBC # BLD: 7.73 K/UL — SIGNIFICANT CHANGE UP (ref 3.8–10.5)
WBC # FLD AUTO: 7.73 K/UL — SIGNIFICANT CHANGE UP (ref 3.8–10.5)

## 2021-04-14 PROCEDURE — 99231 SBSQ HOSP IP/OBS SF/LOW 25: CPT

## 2021-04-14 PROCEDURE — 99233 SBSQ HOSP IP/OBS HIGH 50: CPT | Mod: GC

## 2021-04-14 RX ORDER — FUROSEMIDE 40 MG
40 TABLET ORAL DAILY
Refills: 0 | Status: DISCONTINUED | OUTPATIENT
Start: 2021-04-14 | End: 2021-04-15

## 2021-04-14 RX ORDER — SPIRONOLACTONE 25 MG/1
100 TABLET, FILM COATED ORAL DAILY
Refills: 0 | Status: DISCONTINUED | OUTPATIENT
Start: 2021-04-14 | End: 2021-04-15

## 2021-04-14 RX ADMIN — Medication 1 MILLIGRAM(S): at 10:23

## 2021-04-14 RX ADMIN — Medication 1 TABLET(S): at 10:22

## 2021-04-14 RX ADMIN — SERTRALINE 100 MILLIGRAM(S): 25 TABLET, FILM COATED ORAL at 10:23

## 2021-04-14 RX ADMIN — SPIRONOLACTONE 100 MILLIGRAM(S): 25 TABLET, FILM COATED ORAL at 14:43

## 2021-04-14 RX ADMIN — Medication 100 MILLIGRAM(S): at 10:24

## 2021-04-14 RX ADMIN — Medication 102 MILLIGRAM(S): at 21:56

## 2021-04-14 RX ADMIN — Medication 40 MILLIGRAM(S): at 14:46

## 2021-04-14 RX ADMIN — PANTOPRAZOLE SODIUM 40 MILLIGRAM(S): 20 TABLET, DELAYED RELEASE ORAL at 06:25

## 2021-04-14 RX ADMIN — Medication 40 MILLIGRAM(S): at 16:40

## 2021-04-14 NOTE — PROGRESS NOTE ADULT - SUBJECTIVE AND OBJECTIVE BOX
GASTROENTEROLOGY PROGRESS NOTE  Patient seen and examined at bedside. Feels well. No abd pain, n/v.     PERTINENT REVIEW OF SYSTEMS:  CONSTITUTIONAL: No weakness, fevers or chills  HEENT: No visual changes; No vertigo or throat pain   GASTROINTESTINAL: As above.  NEUROLOGICAL: No numbness or weakness  SKIN: No itching, burning, rashes, or lesions     Allergies    No Known Allergies    Intolerances      MEDICATIONS:  MEDICATIONS  (STANDING):  chlorhexidine 4% Liquid 1 Application(s) Topical <User Schedule>  folic acid 1 milliGRAM(s) Oral daily  furosemide    Tablet 40 milliGRAM(s) Oral daily  insulin lispro (ADMELOG) corrective regimen sliding scale   SubCutaneous Before meals and at bedtime  multivitamin 1 Tablet(s) Oral daily  pantoprazole    Tablet 40 milliGRAM(s) Oral before breakfast  phytonadione  IVPB 10 milliGRAM(s) IV Intermittent every 24 hours  prednisoLONE Syrup 3 mG/mL (PRELONE) 40 milliGRAM(s) Oral every 24 hours  propranolol 20 milliGRAM(s) Oral every 12 hours  sertraline 100 milliGRAM(s) Oral daily  spironolactone 100 milliGRAM(s) Oral daily  thiamine 100 milliGRAM(s) Oral every 24 hours    MEDICATIONS  (PRN):  LORazepam   Injectable 1 milliGRAM(s) IV Push every 2 hours PRN CIWA-Ar score increase by 2 points and a total score of 7 or less    Vital Signs Last 24 Hrs  T(C): 36.7 (2021 12:22), Max: 36.9 (2021 18:35)  T(F): 98 (2021 12:22), Max: 98.5 (2021 18:35)  HR: 93 (2021 12:22) (85 - 114)  BP: 153/88 (2021 12:22) (114/59 - 153/88)  BP(mean): --  RR: 17 (2021 12:22) (16 - 20)  SpO2: 93% (2021 12:22) (93% - 95%)    PHYSICAL EXAM:    General: Well developed; well nourished; in no acute distress  HEENT: MMM, conjunctiva and sclera clear  Gastrointestinal: Soft non-tender significantly improved distension; No rebound or guarding  Skin: Warm and dry. No obvious rash    LABS:                        8.6    7.73  )-----------( 57       ( 2021 08:46 )             24.8     04-14    134<L>  |  98  |  9   ----------------------------<  117<H>  3.8   |  28  |  0.57    Ca    8.1<L>      2021 08:46  Phos  3.0     04-14  Mg     2.2     04-14    TPro  5.2<L>  /  Alb  3.1<L>  /  TBili  12.4<H>  /  DBili  x   /  AST  140<H>  /  ALT  54<H>  /  AlkPhos  95  04-14    PT/INR - ( 2021 08:46 )   PT: 25.6 sec;   INR: 2.22          PTT - ( 2021 08:46 )  PTT:43.9 sec      Urinalysis Basic - ( 2021 18:53 )    Color: Yellow / Appearance: Clear / S.025 / pH: x  Gluc: x / Ketone: Trace mg/dL  / Bili: Large / Urobili: 1.0 E.U./dL   Blood: x / Protein: Trace mg/dL / Nitrite: POSITIVE   Leuk Esterase: Trace / RBC: x / WBC 5-10 /HPF   Sq Epi: x / Non Sq Epi: 0-5 /HPF / Bacteria: Present /HPF                Culture - Body Fluid with Gram Stain (collected 2021 21:57)  Source: Peritoneal peritoneal fluid  Gram Stain (2021 22:33):    No organisms seen    No WBC's seen.  Preliminary Report (2021 09:10):    No growth to date    Culture - Blood (collected 2021 12:12)  Source: .Blood Blood  Preliminary Report (2021 13:00):    No growth at 1 day.    Culture - Body Fluid with Gram Stain (collected 2021 17:05)  Source: Ascites Fl ascitic fluid  Gram Stain (2021 20:48):    No organisms seen    No WBC's seen.  Preliminary Report (2021 08:06):    No growth to date      RADIOLOGY & ADDITIONAL STUDIES:  Reviewed

## 2021-04-14 NOTE — PROGRESS NOTE ADULT - PROBLEM SELECTOR PLAN 2
Likely from portal pressure 2/2 underlying liver disease.  Patient reports many bloody BMs with increasing frequency over the past month.  Patient has remained HD stable since admission.  FOBT+.  Repeat CBC stable between 10-12.    -hold DVT prophylaxis  -monitor CBC  -maintain active T&S  -transfuse if Hgb <7 or HD unstable Likely from portal pressure 2/2 underlying liver disease.  Patient reports many bloody BMs with increasing frequency over the past month.   -hold DVT prophylaxis  -monitor CBC  -maintain active T&S  -transfuse if Hgb <7 or HD unstable

## 2021-04-14 NOTE — PROGRESS NOTE ADULT - ASSESSMENT
44yo M PMHx alcohol abuse, anxiety, depression, HLD, MICHELLE (on CPAP), and herniated disks presents for bloody stool X1 month. GI consulted for c/f alc hep.     #R/o alcoholic hepatitis  - no findings concerning for infectious etiologies, bilirubin continuing to uptrend, would begin prednisolone for alc hep  - no imaging e/o cirrhosis, though significant steatosis and inflammation can obfuscate this picture  - if cirrhotic, MELD-Na 27  - MVI, thiamine, folic acid  - EtOH cessation counseling provided  - paracentesis studies negative for SBP, consistent w/ portal HTN given low total protein and SAAG>1.1  - s/p paracentesis x2 w/ nearly 14L removed; please begin spironolactone 100 mg daily and lasix 40 mg daily  - vitamin K challenge 10 mg IV daily x3 days; not responding thus far  - no s/s hepatic encephalopathy at this time so no role for lactulose/rifaximin  - primary liver disease laboratory w/u so far unrevealing  - Check CBC, Coags, CMP daily  - Supportive care per primary team  - discussed case w/ Dr. Driscoll, would strongly consider obtaining transjugular liver biopsy w/ wedge pressures to ascertain degree of liver damage and prognosticate future liver care    Jess Kurtz MD  PGY-4, Gastroenterology Fellow  pager: [phone]

## 2021-04-14 NOTE — PROGRESS NOTE ADULT - SUBJECTIVE AND OBJECTIVE BOX
CC: Patient is a 45y old  Male who presents with a chief complaint of bright red blood per rectum (2021 15:18)      OVERNIGHT EVENTS: none     SUBJECTIVE / INTERVAL HPI: Patient seen and examined at bedside. Feels well, but states that he thinks he is reaccumulating fluid. Wondering if he can walk down the hallways to help with his pedal edema. Consulting PT.     Prednisolone started per GI. Will reassess for diuretic needs later. Not starting lactulose/rifamixin due to no hepatic encephalopathy. RUQ U/S limited 2/2 ascites. Total paracentesis 13.8L over last two days.     Denies abdominal pain, headache, back pain, dysuria, overnight bloody stools.     ROS: negative unless otherwise stated above.      VITAL SIGNS:  Vital Signs Last 24 Hrs  T(C): 36.9 (2021 21:59), Max: 37.3 (2021 11:37)  T(F): 98.4 (2021 21:59), Max: 99.2 (2021 11:37)  HR: 98 (2021 21:59) (96 - 114)  BP: 114/59 (2021 21:59) (114/59 - 156/80)  BP(mean): --  RR: 17 (2021 06:28) (16 - 20)  SpO2: 94% (2021 21:59) (94% - 95%)    PHYSICAL EXAM:  GENERAL: NAD, lying in bed comfortably  HEAD:  Atraumatic, Normocephalic  EYES: EOMI, PERRLA, scleral icterus  CHEST/LUNG: Clear to auscultation bilaterally; No rales, rhonchi, wheezing, or rubs. Unlabored respirations  HEART: Regular rate and rhythm; No murmurs, rubs, or gallops  ABDOMEN: BSx4; soft, nontender, nondistended, no spider angiomas, no varicosities, mild induration around umbilicus   EXTREMITIES:  2+ Peripheral Pulses, brisk capillary refill. Significant pitting edema to knees b/l  NERVOUS SYSTEM:  A&Ox3, no focal deficits     MEDICATIONS:  MEDICATIONS  (STANDING):  chlorhexidine 4% Liquid 1 Application(s) Topical <User Schedule>  folic acid 1 milliGRAM(s) Oral daily  insulin lispro (ADMELOG) corrective regimen sliding scale   SubCutaneous Before meals and at bedtime  multivitamin 1 Tablet(s) Oral daily  pantoprazole    Tablet 40 milliGRAM(s) Oral before breakfast  phytonadione  IVPB 10 milliGRAM(s) IV Intermittent every 24 hours  prednisoLONE Syrup 3 mG/mL (PRELONE) 40 milliGRAM(s) Oral every 24 hours  propranolol 20 milliGRAM(s) Oral every 12 hours  sertraline 100 milliGRAM(s) Oral daily  thiamine 100 milliGRAM(s) Oral every 24 hours    MEDICATIONS  (PRN):  LORazepam   Injectable 1 milliGRAM(s) IV Push every 2 hours PRN CIWA-Ar score increase by 2 points and a total score of 7 or less      ALLERGIES:  Allergies    No Known Allergies    Intolerances        LABS:                        8.6    7.73  )-----------( 57       ( 2021 08:46 )             24.8     04-14    134<L>  |  98  |  9   ----------------------------<  117<H>  3.8   |  28  |  0.57    Ca    8.1<L>      2021 08:46  Phos  3.0     04-14  Mg     2.2     04-14    TPro  5.2<L>  /  Alb  3.1<L>  /  TBili  12.4<H>  /  DBili  x   /  AST  140<H>  /  ALT  54<H>  /  AlkPhos  95  04-14    PT/INR - ( 2021 08:46 )   PT: 25.6 sec;   INR: 2.22          PTT - ( 2021 08:46 )  PTT:43.9 sec  Urinalysis Basic - ( 2021 18:53 )    Color: Yellow / Appearance: Clear / S.025 / pH: x  Gluc: x / Ketone: Trace mg/dL  / Bili: Large / Urobili: 1.0 E.U./dL   Blood: x / Protein: Trace mg/dL / Nitrite: POSITIVE   Leuk Esterase: Trace / RBC: x / WBC 5-10 /HPF   Sq Epi: x / Non Sq Epi: 0-5 /HPF / Bacteria: Present /HPF      Acute Hepatitis Panel (21 @ 08:28)    Hepatitis C Virus Interpretation: Nonreact: Hepatitis C AB  S/CO Ratio                        Interpretation  < 1.0                                     Non-Reactive  1.0 - 4.9                           Weakly-Reactive  > 5.0                                 Reactive  Non-Reactive: A person witha non-reactive HCV antibody result is  considered uninfected.  No further action is needed unless recent  infection is suspected.  In these cases, consider repeat testing later to  detect seroconversion..  Weakly-Reactive: HCV antibody test is abnormal, HCV RNA Qualitative test  will follow.  Reactive: HCV antibody test is abnormal, HCV RNA Qualitative test will  follow.  Note: HCV antibody testing is performed on the Abbott  system.    Hepatitis C Virus S/CO Ratio: 0.09 S/CO    Hepatitis B Core IgM Antibody: Nonreact    Hepatitis B Surface Antigen: Nonreact    Hepatitis A IgM Antibody: Nonreact        CAPILLARY BLOOD GLUCOSE    POCT Blood Glucose.: 121 mg/dL (2021 08:17)        RADIOLOGY & ADDITIONAL TESTS: Reviewed.    < from: US Abdomen Doppler (21 @ 09:55) >    EXAM:  US DPLX ABDOMEN                          PROCEDURE DATE:  2021          INTERPRETATION:  Abdominal ultrasound    History: Evaluation for hepatic vein thrombosis in the setting of new onset ascites    Prior studies: Correlation with noncontrast CT chest, abdomen, and pelvis from 2021    Findings:    Study is markedly limited due to ascites and bowel gas.    Increased hepatic echogenicity consistent with steatosis. Contour appears smooth. Otherwise limited assessment.    Not well visualized bile ducts, gallbladder, pancreas, spleen.    Mild to moderate moderate ascites in the abdomen greatest in lower quadrants.    Vessels: IVC is patent. Limited assessment of aorta, hepatic veins, common and portal veins due to extensive artifact from gas and fluid.      Impression:  1.  Markedly limited study due to ascites and bowel gas. Nondiagnostic Doppler assessment of hepatic vasculature patency. CT liver protocol with contrast would be helpful for further assessment.  2.  Abdominal ascites, moderate in bilateral lower quadrants.  3.  Hepatic steatosis.    Communication:  Dr. Mtz discussed the above findings of this report with Dr. Almanzar at 10:33 AN on 2021. This verbal communication supplements the text report of this document.          Thank you for the opportunity to participate in the care of this patient.    ESTEFANI MTZ M.D., RADIOLOGY RESIDENT  This document has been electronically signed.  SUKHDEV GARCÍA MD, ATTENDING RADIOLOGIST  This document has been electronically signed. 2021 11:09AM    < end of copied text >       CC: Patient is a 45y old  Male who presents with a chief complaint of bright red blood per rectum (2021 15:18)      OVERNIGHT EVENTS: none     SUBJECTIVE / INTERVAL HPI: Patient seen and examined at bedside. Feels well, but states that he thinks he is reaccumulating fluid. Wondering if he can walk down the hallways to help with his pedal edema. Consulting PT.     Prednisolone started per GI. Will reassess for diuretic needs later. Not starting lactulose/rifamixin due to no hepatic encephalopathy. RUQ U/S limited 2/2 ascites. Total paracentesis 13.8L over last two days.     States that he has had 3 BM with blood traces since yesterday. Denies headache, lightheadedness, SOB, palpitations, abdominal pain, dysuria.      ROS: negative unless otherwise stated above.      VITAL SIGNS:  Vital Signs Last 24 Hrs  T(C): 36.9 (2021 21:59), Max: 37.3 (2021 11:37)  T(F): 98.4 (2021 21:59), Max: 99.2 (2021 11:37)  HR: 98 (2021 21:59) (96 - 114)  BP: 114/59 (2021 21:59) (114/59 - 156/80)  BP(mean): --  RR: 17 (2021 06:28) (16 - 20)  SpO2: 94% (2021 21:59) (94% - 95%)    PHYSICAL EXAM:  GENERAL: NAD, lying in bed comfortably  HEAD:  Atraumatic, Normocephalic  EYES: EOMI, PERRLA, scleral icterus  CHEST/LUNG: Clear to auscultation bilaterally; No rales, rhonchi, wheezing, or rubs. Unlabored respirations  HEART: Regular rate and rhythm; No murmurs, rubs, or gallops  ABDOMEN: BSx4; soft, nontender, nondistended, no spider angiomas, no varicosities, mild induration around umbilicus   EXTREMITIES:  2+ Peripheral Pulses, brisk capillary refill. Significant pitting edema to knees b/l  NERVOUS SYSTEM:  A&Ox3, no focal deficits     MEDICATIONS:  MEDICATIONS  (STANDING):  chlorhexidine 4% Liquid 1 Application(s) Topical <User Schedule>  folic acid 1 milliGRAM(s) Oral daily  insulin lispro (ADMELOG) corrective regimen sliding scale   SubCutaneous Before meals and at bedtime  multivitamin 1 Tablet(s) Oral daily  pantoprazole    Tablet 40 milliGRAM(s) Oral before breakfast  phytonadione  IVPB 10 milliGRAM(s) IV Intermittent every 24 hours  prednisoLONE Syrup 3 mG/mL (PRELONE) 40 milliGRAM(s) Oral every 24 hours  propranolol 20 milliGRAM(s) Oral every 12 hours  sertraline 100 milliGRAM(s) Oral daily  thiamine 100 milliGRAM(s) Oral every 24 hours    MEDICATIONS  (PRN):  LORazepam   Injectable 1 milliGRAM(s) IV Push every 2 hours PRN CIWA-Ar score increase by 2 points and a total score of 7 or less      ALLERGIES:  Allergies    No Known Allergies    Intolerances        LABS:                        8.6    7.73  )-----------( 57       ( 2021 08:46 )             24.8     04-14    134<L>  |  98  |  9   ----------------------------<  117<H>  3.8   |  28  |  0.57    Ca    8.1<L>      2021 08:46  Phos  3.0     04-14  Mg     2.2     -14    TPro  5.2<L>  /  Alb  3.1<L>  /  TBili  12.4<H>  /  DBili  x   /  AST  140<H>  /  ALT  54<H>  /  AlkPhos  95  04-14    PT/INR - ( 2021 08:46 )   PT: 25.6 sec;   INR: 2.22          PTT - ( 2021 08:46 )  PTT:43.9 sec  Urinalysis Basic - ( 2021 18:53 )    Color: Yellow / Appearance: Clear / S.025 / pH: x  Gluc: x / Ketone: Trace mg/dL  / Bili: Large / Urobili: 1.0 E.U./dL   Blood: x / Protein: Trace mg/dL / Nitrite: POSITIVE   Leuk Esterase: Trace / RBC: x / WBC 5-10 /HPF   Sq Epi: x / Non Sq Epi: 0-5 /HPF / Bacteria: Present /HPF      Acute Hepatitis Panel (21 @ 08:28)    Hepatitis C Virus Interpretation: Nonreact: Hepatitis C AB  S/CO Ratio                        Interpretation  < 1.0                                     Non-Reactive  1.0 - 4.9                           Weakly-Reactive  > 5.0                                 Reactive  Non-Reactive: A person witha non-reactive HCV antibody result is  considered uninfected.  No further action is needed unless recent  infection is suspected.  In these cases, consider repeat testing later to  detect seroconversion..  Weakly-Reactive: HCV antibody test is abnormal, HCV RNA Qualitative test  will follow.  Reactive: HCV antibody test is abnormal, HCV RNA Qualitative test will  follow.  Note: HCV antibody testing is performed on the Abbott  system.    Hepatitis C Virus S/CO Ratio: 0.09 S/CO    Hepatitis B Core IgM Antibody: Nonreact    Hepatitis B Surface Antigen: Nonreact    Hepatitis A IgM Antibody: Nonreact        CAPILLARY BLOOD GLUCOSE    POCT Blood Glucose.: 121 mg/dL (2021 08:17)        RADIOLOGY & ADDITIONAL TESTS: Reviewed.    < from: US Abdomen Doppler (21 @ 09:55) >    EXAM:  US DPLX ABDOMEN                          PROCEDURE DATE:  2021          INTERPRETATION:  Abdominal ultrasound    History: Evaluation for hepatic vein thrombosis in the setting of new onset ascites    Prior studies: Correlation with noncontrast CT chest, abdomen, and pelvis from 2021    Findings:    Study is markedly limited due to ascites and bowel gas.    Increased hepatic echogenicity consistent with steatosis. Contour appears smooth. Otherwise limited assessment.    Not well visualized bile ducts, gallbladder, pancreas, spleen.    Mild to moderate moderate ascites in the abdomen greatest in lower quadrants.    Vessels: IVC is patent. Limited assessment of aorta, hepatic veins, common and portal veins due to extensive artifact from gas and fluid.      Impression:  1.  Markedly limited study due to ascites and bowel gas. Nondiagnostic Doppler assessment of hepatic vasculature patency. CT liver protocol with contrast would be helpful for further assessment.  2.  Abdominal ascites, moderate in bilateral lower quadrants.  3.  Hepatic steatosis.    Communication:  Dr. Mtz discussed the above findings of this report with Dr. Almanzar at 10:33 AN on 2021. This verbal communication supplements the text report of this document.          Thank you for the opportunity to participate in the care of this patient.    ESTEFANI MTZ M.D., RADIOLOGY RESIDENT  This document has been electronically signed.  SUKHDEV GARCÍA MD, ATTENDING RADIOLOGIST  This document has been electronically signed. 2021 11:09AM    < end of copied text >       CC: Patient is a 45y old  Male who presents with a chief complaint of bright red blood per rectum (2021 15:18)      OVERNIGHT EVENTS: none     SUBJECTIVE / INTERVAL HPI: Patient seen and examined at bedside. Feels well, but states that he thinks he is reaccumulating fluid. Wondering if he can walk down the hallways to help with his pedal edema. Total paracentesis 13.8L over last two days.     States that he has had 3 BM with blood traces since yesterday. Denies headache, lightheadedness, SOB, palpitations, abdominal pain, dysuria.      ROS: negative unless otherwise stated above.      VITAL SIGNS:  Vital Signs Last 24 Hrs  T(C): 36.9 (2021 21:59), Max: 37.3 (2021 11:37)  T(F): 98.4 (2021 21:59), Max: 99.2 (2021 11:37)  HR: 98 (2021 21:59) (96 - 114)  BP: 114/59 (2021 21:59) (114/59 - 156/80)  BP(mean): --  RR: 17 (2021 06:28) (16 - 20)  SpO2: 94% (2021 21:59) (94% - 95%)    PHYSICAL EXAM:  GENERAL: NAD, lying in bed comfortably  HEAD:  Atraumatic, Normocephalic  EYES: EOMI, PERRLA, scleral icterus  CHEST/LUNG: Clear to auscultation bilaterally; No rales, rhonchi, wheezing, or rubs. Unlabored respirations  HEART: Regular rate and rhythm; No murmurs, rubs, or gallops  ABDOMEN: BSx4; soft, nontender, nondistended, no spider angiomas, no varicosities, mild induration around umbilicus   EXTREMITIES:  2+ Peripheral Pulses, brisk capillary refill. Significant pitting edema to knees b/l  NERVOUS SYSTEM:  A&Ox3, no focal deficits     MEDICATIONS:  MEDICATIONS  (STANDING):  chlorhexidine 4% Liquid 1 Application(s) Topical <User Schedule>  folic acid 1 milliGRAM(s) Oral daily  insulin lispro (ADMELOG) corrective regimen sliding scale   SubCutaneous Before meals and at bedtime  multivitamin 1 Tablet(s) Oral daily  pantoprazole    Tablet 40 milliGRAM(s) Oral before breakfast  phytonadione  IVPB 10 milliGRAM(s) IV Intermittent every 24 hours  prednisoLONE Syrup 3 mG/mL (PRELONE) 40 milliGRAM(s) Oral every 24 hours  propranolol 20 milliGRAM(s) Oral every 12 hours  sertraline 100 milliGRAM(s) Oral daily  thiamine 100 milliGRAM(s) Oral every 24 hours    MEDICATIONS  (PRN):  LORazepam   Injectable 1 milliGRAM(s) IV Push every 2 hours PRN CIWA-Ar score increase by 2 points and a total score of 7 or less      ALLERGIES:  Allergies    No Known Allergies    Intolerances        LABS:                        8.6    7.73  )-----------( 57       ( 2021 08:46 )             24.8     -    134<L>  |  98  |  9   ----------------------------<  117<H>  3.8   |  28  |  0.57    Ca    8.1<L>      2021 08:46  Phos  3.0     -  Mg     2.2         TPro  5.2<L>  /  Alb  3.1<L>  /  TBili  12.4<H>  /  DBili  x   /  AST  140<H>  /  ALT  54<H>  /  AlkPhos  95  -14    PT/INR - ( 2021 08:46 )   PT: 25.6 sec;   INR: 2.22          PTT - ( 2021 08:46 )  PTT:43.9 sec  Urinalysis Basic - ( 2021 18:53 )    Color: Yellow / Appearance: Clear / S.025 / pH: x  Gluc: x / Ketone: Trace mg/dL  / Bili: Large / Urobili: 1.0 E.U./dL   Blood: x / Protein: Trace mg/dL / Nitrite: POSITIVE   Leuk Esterase: Trace / RBC: x / WBC 5-10 /HPF   Sq Epi: x / Non Sq Epi: 0-5 /HPF / Bacteria: Present /HPF      Acute Hepatitis Panel (21 @ 08:28)    Hepatitis C Virus Interpretation: Nonreact: Hepatitis C AB  S/CO Ratio                        Interpretation  < 1.0                                     Non-Reactive  1.0 - 4.9                           Weakly-Reactive  > 5.0                                 Reactive  Non-Reactive: A person witha non-reactive HCV antibody result is  considered uninfected.  No further action is needed unless recent  infection is suspected.  In these cases, consider repeat testing later to  detect seroconversion..  Weakly-Reactive: HCV antibody test is abnormal, HCV RNA Qualitative test  will follow.  Reactive: HCV antibody test is abnormal, HCV RNA Qualitative test will  follow.  Note: HCV antibody testing is performed on the Abbott  system.    Hepatitis C Virus S/CO Ratio: 0.09 S/CO    Hepatitis B Core IgM Antibody: Nonreact    Hepatitis B Surface Antigen: Nonreact    Hepatitis A IgM Antibody: Nonreact        CAPILLARY BLOOD GLUCOSE    POCT Blood Glucose.: 121 mg/dL (2021 08:17)        RADIOLOGY & ADDITIONAL TESTS: Reviewed.    < from: US Abdomen Doppler (21 @ 09:55) >    EXAM:  US DPLX ABDOMEN                          PROCEDURE DATE:  2021          INTERPRETATION:  Abdominal ultrasound    History: Evaluation for hepatic vein thrombosis in the setting of new onset ascites    Prior studies: Correlation with noncontrast CT chest, abdomen, and pelvis from 2021    Findings:    Study is markedly limited due to ascites and bowel gas.    Increased hepatic echogenicity consistent with steatosis. Contour appears smooth. Otherwise limited assessment.    Not well visualized bile ducts, gallbladder, pancreas, spleen.    Mild to moderate moderate ascites in the abdomen greatest in lower quadrants.    Vessels: IVC is patent. Limited assessment of aorta, hepatic veins, common and portal veins due to extensive artifact from gas and fluid.      Impression:  1.  Markedly limited study due to ascites and bowel gas. Nondiagnostic Doppler assessment of hepatic vasculature patency. CT liver protocol with contrast would be helpful for further assessment.  2.  Abdominal ascites, moderate in bilateral lower quadrants.  3.  Hepatic steatosis.    Communication:  Dr. Mtz discussed the above findings of this report with Dr. Almanzar at 10:33 AN on 2021. This verbal communication supplements the text report of this document.          Thank you for the opportunity to participate in the care of this patient.    ESTEFANI MTZ M.D., RADIOLOGY RESIDENT  This document has been electronically signed.  SUKHDEV GARCÍA MD, ATTENDING RADIOLOGIST  This document has been electronically signed. 2021 11:09AM    < end of copied text >

## 2021-04-14 NOTE — PROGRESS NOTE ADULT - PROBLEM SELECTOR PLAN 3
New onset abdominal distention i/s/o significant alcohol use.  CT abdomen results as follows: Hepatic steatosis. Mild splenomegaly. Abdominal varicose veins. Large ascites. Mesenteric edema. Extensive subcutaneous edema in the anterior and chest wall.    -30 point MELD score with 52.6% estimated 3-month mortality  -RUQ U/S indeterminate     #R/O hepatic vein thrombosis  -abd US indeterminate     #Hepatic steatosis  Likely 2/2 alcohol abuse  -Identified on CT Abd New onset abdominal distention i/s/o significant alcohol use.  CT abdomen results as follows: Hepatic steatosis. Mild splenomegaly. Abdominal varicose veins. Large ascites. Mesenteric edema. Extensive subcutaneous edema in the anterior and chest wall.   -30 point MELD score with 52.6% estimated 3-month mortality  -RUQ U/S indeterminate     #R/O hepatic vein thrombosis  -abd US indeterminate     #Hepatic steatosis  Likely 2/2 alcohol abuse  -Identified on CT Abd

## 2021-04-14 NOTE — PHYSICAL THERAPY INITIAL EVALUATION ADULT - ADDITIONAL COMMENTS
Pt reports he lives with  in an elevator building with 5 TIGIST. Pt reports he was ind w/ all functional mobility PTA w/out the use of an AD.

## 2021-04-14 NOTE — PROGRESS NOTE ADULT - PROBLEM SELECTOR PLAN 1
Patient with excessive alcohol use and increasing quantities since pandemic began.  Supporting clinical factors include jaundice and scleral icterus upon exam, initial AST//101, prolonged INR, elevated T bili (11.0), and leukocytosis.  JHON elevated.  CT abd with identification of large volume abdominal ascites.  - GI consulted, appreciate recs  - s/p therapeutic paracentesis 4/12 and 4/13. Total of 13.8L drained. Continue monitoring.   - started on prednisolone per GI   - MVI, thiamine (now 100mg), folic acid  - vitamin K challenge 10 mg IV daily x3 days (4/12-4/15)  - f/u Acute Hepatitis Panel, antinuclear antibody, antismooth muscle antibody, anti-liver/kidney microsomal antibody type 1, immunoglobulin level, EBV serologies, CMV IgM, HSV IgM, Ceruloplasmin Level  - Check CBC, Coags, CMP daily Patient with excessive alcohol use and increasing quantities since pandemic began.  Supporting clinical factors include jaundice and scleral icterus upon exam, initial AST//101, prolonged INR, elevated T bili (11.0), and leukocytosis.  JHON elevated.  CT abd with identification of large volume abdominal ascites.  - GI consulted, appreciate recs. Not starting lactulose/rifamixin due to no hepatic encephalopathy. Will reassess diuretic need. Started on prednisolone.   - s/p therapeutic paracentesis 4/12 and 4/13. Total of 13.8L drained. Continue monitoring.   - MVI, thiamine (now 100mg), folic acid  - vitamin K challenge 10 mg IV daily x3 days (4/12-4/15)  - f/u Acute Hepatitis Panel, antinuclear antibody, antismooth muscle antibody, anti-liver/kidney microsomal antibody type 1, immunoglobulin level, EBV serologies, CMV IgM, HSV IgM, Ceruloplasmin Level  - Check CBC, Coags, CMP daily

## 2021-04-14 NOTE — PROGRESS NOTE ADULT - PROBLEM SELECTOR PLAN 4
Last drink was 4/11 at 9pm.  Patient open for counseling during this admission.  -continue CIWA protocol   -Ativan 1mg for CIWA >7  -monitor for signs and symptoms of withdrawal  -brought to ED 2/2 inability to get up after fall. PT consulted for evaluation of strength. Last drink was 4/11 at 9pm.  Patient open for counseling during this admission.  -continue CIWA protocol   -Ativan 1mg for CIWA >7  -monitor for signs and symptoms of withdrawal  -brought to ED 2/2 inability to get up after fall. PT consulted for evaluation of fall risk.

## 2021-04-14 NOTE — PROGRESS NOTE ADULT - PROBLEM SELECTOR PLAN 8
Uses CPAP at home at bedtime, although does not know settings.    -obtain collateral from  about home setting  -CPAP while inpatient

## 2021-04-14 NOTE — PROGRESS NOTE ADULT - PROBLEM SELECTOR PLAN 7
Hx of anxiety on propranolol 20mg BID  -restarted propranolol; not concerned for GI bleed at this time     #Depression  On zoloft 100mg QD  -c/w home med

## 2021-04-15 LAB
ALBUMIN SERPL ELPH-MCNC: 3.5 G/DL — SIGNIFICANT CHANGE UP (ref 3.3–5)
ALP SERPL-CCNC: 113 U/L — SIGNIFICANT CHANGE UP (ref 40–120)
ALT FLD-CCNC: 65 U/L — HIGH (ref 10–45)
ANION GAP SERPL CALC-SCNC: 11 MMOL/L — SIGNIFICANT CHANGE UP (ref 5–17)
APTT BLD: 36.2 SEC — HIGH (ref 27.5–35.5)
AST SERPL-CCNC: 160 U/L — HIGH (ref 10–40)
BASOPHILS # BLD AUTO: 0 K/UL — SIGNIFICANT CHANGE UP (ref 0–0.2)
BASOPHILS NFR BLD AUTO: 0 % — SIGNIFICANT CHANGE UP (ref 0–2)
BILIRUB SERPL-MCNC: 12 MG/DL — HIGH (ref 0.2–1.2)
BUN SERPL-MCNC: 11 MG/DL — SIGNIFICANT CHANGE UP (ref 7–23)
CALCIUM SERPL-MCNC: 8.3 MG/DL — LOW (ref 8.4–10.5)
CHLORIDE SERPL-SCNC: 97 MMOL/L — SIGNIFICANT CHANGE UP (ref 96–108)
CO2 SERPL-SCNC: 28 MMOL/L — SIGNIFICANT CHANGE UP (ref 22–31)
CREAT SERPL-MCNC: 0.7 MG/DL — SIGNIFICANT CHANGE UP (ref 0.5–1.3)
EOSINOPHIL # BLD AUTO: 0 K/UL — SIGNIFICANT CHANGE UP (ref 0–0.5)
EOSINOPHIL NFR BLD AUTO: 0 % — SIGNIFICANT CHANGE UP (ref 0–6)
GLUCOSE BLDC GLUCOMTR-MCNC: 114 MG/DL — HIGH (ref 70–99)
GLUCOSE BLDC GLUCOMTR-MCNC: 92 MG/DL — SIGNIFICANT CHANGE UP (ref 70–99)
GLUCOSE BLDC GLUCOMTR-MCNC: 95 MG/DL — SIGNIFICANT CHANGE UP (ref 70–99)
GLUCOSE BLDC GLUCOMTR-MCNC: 99 MG/DL — SIGNIFICANT CHANGE UP (ref 70–99)
GLUCOSE SERPL-MCNC: 99 MG/DL — SIGNIFICANT CHANGE UP (ref 70–99)
HCT VFR BLD CALC: 29.3 % — LOW (ref 39–50)
HGB BLD-MCNC: 9.9 G/DL — LOW (ref 13–17)
HSV1 AB FLD QL: SIGNIFICANT CHANGE UP TITER
HSV2 AB FLD-ACNC: SIGNIFICANT CHANGE UP TITER
INR BLD: 2.05 — HIGH (ref 0.88–1.16)
LYMPHOCYTES # BLD AUTO: 1.08 K/UL — SIGNIFICANT CHANGE UP (ref 1–3.3)
LYMPHOCYTES # BLD AUTO: 11.3 % — LOW (ref 13–44)
MAGNESIUM SERPL-MCNC: 2.1 MG/DL — SIGNIFICANT CHANGE UP (ref 1.6–2.6)
MCHC RBC-ENTMCNC: 33.3 PG — SIGNIFICANT CHANGE UP (ref 27–34)
MCHC RBC-ENTMCNC: 33.8 GM/DL — SIGNIFICANT CHANGE UP (ref 32–36)
MCV RBC AUTO: 98.7 FL — SIGNIFICANT CHANGE UP (ref 80–100)
MONOCYTES # BLD AUTO: 0.34 K/UL — SIGNIFICANT CHANGE UP (ref 0–0.9)
MONOCYTES NFR BLD AUTO: 3.5 % — SIGNIFICANT CHANGE UP (ref 2–14)
NEUTROPHILS # BLD AUTO: 8.09 K/UL — HIGH (ref 1.8–7.4)
NEUTROPHILS NFR BLD AUTO: 84.3 % — HIGH (ref 43–77)
PHOSPHATE SERPL-MCNC: 3.3 MG/DL — SIGNIFICANT CHANGE UP (ref 2.5–4.5)
PLATELET # BLD AUTO: 74 K/UL — LOW (ref 150–400)
POTASSIUM SERPL-MCNC: 3.9 MMOL/L — SIGNIFICANT CHANGE UP (ref 3.5–5.3)
POTASSIUM SERPL-SCNC: 3.9 MMOL/L — SIGNIFICANT CHANGE UP (ref 3.5–5.3)
PROT SERPL-MCNC: 5.9 G/DL — LOW (ref 6–8.3)
PROTHROM AB SERPL-ACNC: 23.8 SEC — HIGH (ref 10.6–13.6)
RBC # BLD: 2.97 M/UL — LOW (ref 4.2–5.8)
RBC # FLD: 21.2 % — HIGH (ref 10.3–14.5)
SARS-COV-2 RNA SPEC QL NAA+PROBE: SIGNIFICANT CHANGE UP
SODIUM SERPL-SCNC: 136 MMOL/L — SIGNIFICANT CHANGE UP (ref 135–145)
WBC # BLD: 9.6 K/UL — SIGNIFICANT CHANGE UP (ref 3.8–10.5)
WBC # FLD AUTO: 9.6 K/UL — SIGNIFICANT CHANGE UP (ref 3.8–10.5)

## 2021-04-15 PROCEDURE — 99233 SBSQ HOSP IP/OBS HIGH 50: CPT | Mod: GC

## 2021-04-15 PROCEDURE — 99231 SBSQ HOSP IP/OBS SF/LOW 25: CPT

## 2021-04-15 RX ORDER — SPIRONOLACTONE 25 MG/1
100 TABLET, FILM COATED ORAL EVERY 12 HOURS
Refills: 0 | Status: DISCONTINUED | OUTPATIENT
Start: 2021-04-15 | End: 2021-04-16

## 2021-04-15 RX ORDER — FUROSEMIDE 40 MG
40 TABLET ORAL EVERY 12 HOURS
Refills: 0 | Status: DISCONTINUED | OUTPATIENT
Start: 2021-04-15 | End: 2021-04-16

## 2021-04-15 RX ADMIN — Medication 1 TABLET(S): at 11:32

## 2021-04-15 RX ADMIN — SERTRALINE 100 MILLIGRAM(S): 25 TABLET, FILM COATED ORAL at 11:32

## 2021-04-15 RX ADMIN — Medication 40 MILLIGRAM(S): at 16:05

## 2021-04-15 RX ADMIN — CHLORHEXIDINE GLUCONATE 1 APPLICATION(S): 213 SOLUTION TOPICAL at 06:17

## 2021-04-15 RX ADMIN — SPIRONOLACTONE 100 MILLIGRAM(S): 25 TABLET, FILM COATED ORAL at 06:15

## 2021-04-15 RX ADMIN — Medication 40 MILLIGRAM(S): at 17:00

## 2021-04-15 RX ADMIN — SPIRONOLACTONE 100 MILLIGRAM(S): 25 TABLET, FILM COATED ORAL at 17:30

## 2021-04-15 RX ADMIN — Medication 1 MILLIGRAM(S): at 11:31

## 2021-04-15 RX ADMIN — PANTOPRAZOLE SODIUM 40 MILLIGRAM(S): 20 TABLET, DELAYED RELEASE ORAL at 06:15

## 2021-04-15 RX ADMIN — Medication 100 MILLIGRAM(S): at 11:31

## 2021-04-15 RX ADMIN — Medication 40 MILLIGRAM(S): at 06:15

## 2021-04-15 NOTE — PROGRESS NOTE ADULT - PROBLEM SELECTOR PLAN 7
Hx of anxiety on propranolol 20mg BID  - restarted propranolol; not concerned for GI bleed at this time     #Depression  On zoloft 100mg QD  -c/w home med

## 2021-04-15 NOTE — PROGRESS NOTE ADULT - PROBLEM SELECTOR PLAN 5
Although patient may have SBP, suspicion for underlying infection remains low.  RESOLVED- Elevated lactate upon admission likely 2/2 poor liver function and adequate clearance. Lactate downtrending from 6.3-->4.7-->3.5  -trend to clearance

## 2021-04-15 NOTE — DIETITIAN INITIAL EVALUATION ADULT. - ORAL INTAKE PTA/DIET HISTORY
Patient admittedly did not follow any restrictions in diet PTA.Long standing history of losing and gaining weight.Reported a lot of stressors in life from work.[place].

## 2021-04-15 NOTE — PROGRESS NOTE ADULT - ASSESSMENT
46yo M PMHx alcohol abuse, anxiety, depression, HLD, MICHELLE (on CPAP), and herniated disks presents for bloody stool X1 month. GI consulted for c/f alc hep.     #R/o alcoholic hepatitis  - no findings concerning for infectious etiologies, continue prednisolone for alc hep, MELD is improving, assess Lille score (4 days not inferior to 7 days per recent studies so can use it as surrogate at this time, but important that patient follow up in clinic)  - no imaging e/o cirrhosis, though significant steatosis and inflammation can obfuscate this picture  - if cirrhotic, MELD-Na 25  - MVI, thiamine, folic acid  - EtOH cessation counseling provided  - paracentesis studies negative for SBP, consistent w/ portal HTN given low total protein and SAAG>1.1  - s/p paracentesis x2 w/ nearly 14L removed; please begin spironolactone 100 mg daily and lasix 40 mg daily  - s/p vitamin K challenge 10 mg IV daily x3 days  - no s/s hepatic encephalopathy at this time so no role for lactulose/rifaximin  - primary liver disease laboratory w/u so far unrevealing  - Check CBC, Coags, CMP daily  - Supportive care per primary team  - discussed case w/ Dr. Driscoll, would strongly consider obtaining transjugular liver biopsy w/ wedge pressures to ascertain degree of liver damage and prognosticate future liver care    Jess Kurtz MD  PGY-4, Gastroenterology Fellow  pager: [phone] 46yo M PMHx alcohol abuse, anxiety, depression, HLD, MICHELLE (on CPAP), and herniated disks presents for bloody stool X1 month. GI consulted for c/f alc hep.     #R/o alcoholic hepatitis  - no findings concerning for infectious etiologies, continue prednisolone for alc hep, MELD is improving, assess Lille score (4 days not inferior to 7 days per recent studies so can use it as surrogate at this time, but important that patient follow up in clinic)  - no imaging e/o cirrhosis, though significant steatosis and inflammation can obfuscate this picture  - if cirrhotic, MELD-Na 25  - MVI, thiamine, folic acid  - EtOH cessation counseling provided  - paracentesis studies negative for SBP, consistent w/ portal HTN given low total protein and SAAG>1.1  - s/p paracentesis x2 w/ nearly 14L removed; please increase spironolactone to 200 mg daily total and lasix 80 mg daily total  - s/p vitamin K challenge 10 mg IV daily x3 days  - no s/s hepatic encephalopathy at this time so no role for lactulose/rifaximin  - primary liver disease laboratory w/u so far unrevealing  - Check CBC, Coags, CMP daily  - Supportive care per primary team  - discussed case w/ Dr. Driscoll, would strongly consider obtaining transjugular liver biopsy w/ wedge pressures to ascertain degree of liver damage and prognosticate future liver care    Jess Kurtz MD  PGY-4, Gastroenterology Fellow  pager: [phone]

## 2021-04-15 NOTE — DIETITIAN NUTRITION RISK NOTIFICATION - TREATMENT: THE FOLLOWING DIET HAS BEEN RECOMMENDED
Diet, Regular:   Supplement Feeding Modality:  Oral  Ensure High Protein Cans or Servings Per Day:  1       Frequency:  Daily (04-13-21 @ 10:11) [Active]

## 2021-04-15 NOTE — PROGRESS NOTE ADULT - PROBLEM SELECTOR PLAN 2
Likely from portal pressure 2/2 underlying liver disease.  Patient reports many bloody BMs with increasing frequency over the past month.   -hold DVT prophylaxis  -monitor CBC  -maintain active T&S  -transfuse if Hgb <7 or HD unstable

## 2021-04-15 NOTE — PROGRESS NOTE ADULT - SUBJECTIVE AND OBJECTIVE BOX
OVERNIGHT EVENTS: Patient had CPAP on overnight.     SUBJECTIVE / INTERVAL HPI: Patient seen and examined at bedside. Patient feels the same. Continues to complain of feeling as though he is "reaccumulating fluid".     VITAL SIGNS:  Vital Signs Last 24 Hrs  T(C): 36.7 (15 Apr 2021 12:41), Max: 36.7 (14 Apr 2021 20:41)  T(F): 98 (15 Apr 2021 12:41), Max: 98.1 (14 Apr 2021 20:41)  HR: 79 (15 Apr 2021 12:41) (71 - 86)  BP: 146/80 (15 Apr 2021 12:41) (121/70 - 146/80)  BP(mean): --  RR: 18 (15 Apr 2021 12:41) (16 - 20)  SpO2: 96% (15 Apr 2021 12:41) (93% - 96%)    PHYSICAL EXAM:    GENERAL: NAD, lying in bed comfortably  HEAD:  Atraumatic, Normocephalic  EYES: EOMI, PERRLA, scleral icterus  CHEST/LUNG: Clear to auscultation bilaterally; No rales, rhonchi, wheezing, or rubs. Unlabored respirations  HEART: Regular rate and rhythm; No murmurs, rubs, or gallops  ABDOMEN: BSx4; distended, nontender, no spider angiomas, no varicosities, mild induration around umbilicus   EXTREMITIES:  2+ Peripheral Pulses, brisk capillary refill. 2+ pitting edema b/l to knees  NERVOUS SYSTEM:  A&Ox3, no focal deficits     MEDICATIONS:  MEDICATIONS  (STANDING):  chlorhexidine 4% Liquid 1 Application(s) Topical <User Schedule>  folic acid 1 milliGRAM(s) Oral daily  furosemide    Tablet 40 milliGRAM(s) Oral every 12 hours  insulin lispro (ADMELOG) corrective regimen sliding scale   SubCutaneous Before meals and at bedtime  multivitamin 1 Tablet(s) Oral daily  pantoprazole    Tablet 40 milliGRAM(s) Oral before breakfast  prednisoLONE Syrup 3 mG/mL (PRELONE) 40 milliGRAM(s) Oral every 24 hours  propranolol 20 milliGRAM(s) Oral every 12 hours  sertraline 100 milliGRAM(s) Oral daily  spironolactone 100 milliGRAM(s) Oral every 12 hours  thiamine 100 milliGRAM(s) Oral every 24 hours    MEDICATIONS  (PRN):  LORazepam   Injectable 1 milliGRAM(s) IV Push every 2 hours PRN CIWA-Ar score increase by 2 points and a total score of 7 or less      ALLERGIES:  Allergies    No Known Allergies    Intolerances        LABS:                        9.9    9.60  )-----------( 74       ( 15 Apr 2021 10:06 )             29.3     04-15    136  |  97  |  11  ----------------------------<  99  3.9   |  28  |  0.70    Ca    8.3<L>      15 Apr 2021 10:06  Phos  3.3     04-15  Mg     2.1     04-15    TPro  5.9<L>  /  Alb  3.5  /  TBili  12.0<H>  /  DBili  x   /  AST  160<H>  /  ALT  65<H>  /  AlkPhos  113  04-15    PT/INR - ( 15 Apr 2021 10:06 )   PT: 23.8 sec;   INR: 2.05          PTT - ( 15 Apr 2021 10:06 )  PTT:36.2 sec    CAPILLARY BLOOD GLUCOSE      POCT Blood Glucose.: 92 mg/dL (15 Apr 2021 12:00)      RADIOLOGY & ADDITIONAL TESTS: Reviewed.

## 2021-04-15 NOTE — PROGRESS NOTE ADULT - PROBLEM SELECTOR PLAN 4
Last drink was 4/11 at 9pm.  Patient open for counseling during this admission.  - d/c CIWA protocol  - no concern for withdrawal at this time  - brought to ED 2/2 inability to get up after fall. PT consulted for evaluation of fall risk.

## 2021-04-15 NOTE — DIETITIAN INITIAL EVALUATION ADULT. - OTHER INFO
46y/o Morbidly obese male with history of Alcohol abuse, Anxiety/ Depression, ,HLD, MICHELLE, Herniated Disc admitted with 1 month of BRBPR and distended abdomen .Denied pain or N/V/D/C.Claims to have many BM daily.Skin intact.Patient on admission was 188% of IBW(pre-paracentesis)Per patient weight 1 year ago was about 240lbs,Admitted weight:312lbs.BMI:44.9.IBW:166lbs.? some related to fluid. Noted large abdominal girth. As per patient being D/C tomorrow. RD encouraged attempts at weight loss.Uncertain how motivated patient is at this time.

## 2021-04-15 NOTE — PROGRESS NOTE ADULT - SUBJECTIVE AND OBJECTIVE BOX
GASTROENTEROLOGY PROGRESS NOTE  Patient seen and examined at bedside. No acute complaints. Feels like his abdomen is still slightly distended.     PERTINENT REVIEW OF SYSTEMS:  CONSTITUTIONAL: No weakness, fevers or chills  HEENT: No visual changes; No vertigo or throat pain   GASTROINTESTINAL: As above.  NEUROLOGICAL: No numbness or weakness  SKIN: No itching, burning, rashes, or lesions     Allergies    No Known Allergies    Intolerances      MEDICATIONS:  MEDICATIONS  (STANDING):  chlorhexidine 4% Liquid 1 Application(s) Topical <User Schedule>  folic acid 1 milliGRAM(s) Oral daily  furosemide    Tablet 40 milliGRAM(s) Oral daily  insulin lispro (ADMELOG) corrective regimen sliding scale   SubCutaneous Before meals and at bedtime  multivitamin 1 Tablet(s) Oral daily  pantoprazole    Tablet 40 milliGRAM(s) Oral before breakfast  prednisoLONE Syrup 3 mG/mL (PRELONE) 40 milliGRAM(s) Oral every 24 hours  propranolol 20 milliGRAM(s) Oral every 12 hours  sertraline 100 milliGRAM(s) Oral daily  spironolactone 100 milliGRAM(s) Oral daily  thiamine 100 milliGRAM(s) Oral every 24 hours    MEDICATIONS  (PRN):  LORazepam   Injectable 1 milliGRAM(s) IV Push every 2 hours PRN CIWA-Ar score increase by 2 points and a total score of 7 or less    Vital Signs Last 24 Hrs  T(C): 36.7 (15 Apr 2021 12:41), Max: 36.7 (14 Apr 2021 20:41)  T(F): 98 (15 Apr 2021 12:41), Max: 98.1 (14 Apr 2021 20:41)  HR: 79 (15 Apr 2021 12:41) (71 - 86)  BP: 146/80 (15 Apr 2021 12:41) (121/70 - 146/80)  BP(mean): --  RR: 18 (15 Apr 2021 12:41) (16 - 20)  SpO2: 96% (15 Apr 2021 12:41) (93% - 96%)    PHYSICAL EXAM:    General: Well developed; well nourished; in no acute distress  HEENT: MMM, +icteric sclera  Gastrointestinal: Soft non-tender non-distended; Normal bowel sounds; No hepatosplenomegaly. No rebound or guarding  Skin: Warm and dry. +jaundice    LABS:                        9.9    9.60  )-----------( 74       ( 15 Apr 2021 10:06 )             29.3     04-15    136  |  97  |  11  ----------------------------<  99  3.9   |  28  |  0.70    Ca    8.3<L>      15 Apr 2021 10:06  Phos  3.3     04-15  Mg     2.1     04-15    TPro  5.9<L>  /  Alb  3.5  /  TBili  12.0<H>  /  DBili  x   /  AST  160<H>  /  ALT  65<H>  /  AlkPhos  113  04-15    PT/INR - ( 15 Apr 2021 10:06 )   PT: 23.8 sec;   INR: 2.05          PTT - ( 15 Apr 2021 10:06 )  PTT:36.2 sec                  Culture - Body Fluid with Gram Stain (collected 13 Apr 2021 21:57)  Source: Peritoneal peritoneal fluid  Gram Stain (13 Apr 2021 22:33):    No organisms seen    No WBC's seen.  Preliminary Report (14 Apr 2021 09:10):    No growth to date    Culture - Blood (collected 13 Apr 2021 12:12)  Source: .Blood Blood  Preliminary Report (15 Apr 2021 13:00):    No growth at 2 days.    Culture - Body Fluid with Gram Stain (collected 12 Apr 2021 17:05)  Source: Ascites Fl ascitic fluid  Gram Stain (12 Apr 2021 20:48):    No organisms seen    No WBC's seen.  Preliminary Report (13 Apr 2021 08:06):    No growth to date      RADIOLOGY & ADDITIONAL STUDIES:  Reviewed

## 2021-04-15 NOTE — PROGRESS NOTE ADULT - PROBLEM SELECTOR PLAN 1
Patient with excessive alcohol use and increasing quantities since pandemic began.  Supporting clinical factors include jaundice and scleral icterus upon exam, initial AST//101, prolonged INR, elevated T bili (11.0), and leukocytosis.  JHON elevated.  CT abd with identification of large volume abdominal ascites.  - GI consulted, appreciate recs. Not starting lactulose/rifamixin due to no hepatic encephalopathy. Will reassess diuretic need. Started on prednisolone.   - s/p therapeutic paracentesis 4/12 and 4/13. Total of 13.8L drained. Continue monitoring.   - MVI, thiamine (now 100mg), folic acid  - vitamin K challenge 10 mg IV daily x3 days (4/12-4/15)  - f/u Acute Hepatitis Panel, antinuclear antibody, antismooth muscle antibody, anti-liver/kidney microsomal antibody type 1, immunoglobulin level, EBV serologies, CMV IgM, HSV IgM, Ceruloplasmin Level  - Check CBC, Coags, CMP daily  - Plan for transjugular bx of liver tomorrow with wedge pressure tomorrow  - NPO after midnight Patient with excessive alcohol use and increasing quantities since pandemic began.  Supporting clinical factors include jaundice and scleral icterus upon exam, initial AST//101, prolonged INR, elevated T bili (11.0), and leukocytosis.  JHON elevated.  CT abd with identification of large volume abdominal ascites.  - GI consulted, appreciate recs. Not starting lactulose/rifamixin due to no hepatic encephalopathy. Will reassess diuretic need. Started on prednisolone.   - s/p therapeutic paracentesis 4/12 and 4/13. Total of 13.8L drained. Continue monitoring.   - MVI, thiamine (now 100mg), folic acid  - vitamin K challenge 10 mg IV daily x3 days (4/12-4/15)  - f/u Acute Hepatitis Panel, antinuclear antibody, antismooth muscle antibody, anti-liver/kidney microsomal antibody type 1, immunoglobulin level, EBV serologies, CMV IgM, HSV IgM, Ceruloplasmin Level  - Check CBC, Coags, CMP daily  - Started on Lasix 40 and spironolactone 100 yesterday  - Increased Lasix to 40mg BID and spironolactone 200 QD  - Plan for transjugular bx of liver tomorrow with wedge pressure tomorrow  - NPO after midnight    #R/O urinary retention- patient with concern for urinary retention. 100cc UOP despite being on Lasix and spironolactone   - Bladder scan 276, will f/u repeat scan

## 2021-04-15 NOTE — PROGRESS NOTE ADULT - ATTENDING COMMENTS
44yo M PMHx alcohol abuse. GI consulted for c/f alc hep.     #R/o alcoholic hepatitis  - no findings concerning for infectious etiologies, bilirubin continuing to uptrend, would begin prednisolone for alc hep  - no imaging e/o cirrhosis, though significant steatosis and inflammation can obfuscate this picture  - if cirrhotic, MELD-Na 27  - MVI, thiamine, folic acid  - EtOH cessation counseling provided  - paracentesis studies negative for SBP, consistent w/ portal HTN given low total protein and SAAG>1.1  - s/p paracentesis x2 w/ nearly 14L removed; please begin spironolactone 100 mg daily and lasix 40 mg daily  - vitamin K challenge 10 mg IV daily x3 days; not responding thus far  - no s/s hepatic encephalopathy at this time so no role for lactulose/rifaximin  - primary liver disease laboratory w/u so far unrevealing  - Check CBC, Coags, CMP daily  - Supportive care per primary team  - discussed case w/ Dr. Driscoll, would strongly consider obtaining transjugular liver biopsy w/ wedge pressures to ascertain degree of liver damage and prognosticate future liver care
46yo M PMHx alcohol abuse,  GI consulted for c/f alc hep.     #R/o alcoholic hepatitis  - no findings concerning for infectious etiologies, continue prednisolone for alc hep, MELD is improving, assess Lille score (4 days not inferior to 7 days per recent studies so can use it as surrogate at this time, but important that patient follow up in clinic)  - no imaging e/o cirrhosis, though significant steatosis and inflammation can obfuscate this picture  - if cirrhotic, MELD-Na 25  - MVI, thiamine, folic acid  - EtOH cessation counseling provided  - paracentesis studies negative for SBP, consistent w/ portal HTN given low total protein and SAAG>1.1  - s/p paracentesis x2 w/ nearly 14L removed; please increase spironolactone to 200 mg daily total and lasix 80 mg daily total  - s/p vitamin K challenge 10 mg IV daily x3 days  - no s/s hepatic encephalopathy at this time so no role for lactulose/rifaximin  - primary liver disease laboratory w/u so far unrevealing  - Check CBC, Coags, CMP daily  - Supportive care per primary team  - transjugular liver biopsy tmrw w/ wedge pressures to ascertain degree of liver damage and prognosticate future liver care
46yo M PMHx alcohol abuse, here for bloody stool X1 month. GI consulted for c/f alc hep.     #R/o alcoholic hepatitis  - no findings concerning for infectious etiologies, bilirubin continuing to uptrend, would begin prednisolone for alc hep  - no imaging e/o cirrhosis, though significant steatosis and inflammation can obfuscate this picture  - if cirrhotic, MELD-Na 30   - MVI, thiamine, folic acid  - EtOH cessation counseling provided  - paracentesis studies negative for SBP, consistent w/ portal HTN given low total protein and SAAG>1.1  - will determine diuretics in coming days following rpt therapeutic paracentesis  - vitamin K challenge 10 mg IV daily x3 days  - no s/s hepatic encephalopathy at this time so no role for lactulose/rifaximin  - f/u primary liver disease laboratory w/u: Acute Hepatitis Panel, antinuclear antibody, antismooth muscle antibody, anti-liver/kidney microsomal antibody type 1, immunoglobulin level, EBV serologies, CMV IgM, HSV IgM, Ceruloplasmin Level  - Check CBC, Coags, CMP daily  - Supportive care per primary team  - monitor for w/drawal symptoms
Pt feels well - notes his abd is slightly larger than yesterday. Denies abd pain, dyspnea, nausea, or pruritis. Abd large, distended, non-tender. nml resp effort. A/O x3, no numbness, ambulating    #Acute alcoholic cirrhosis - on prednisolone   - Volume - s/p 9L yesterday; 4.5L prior  #Normocytic anemia - no reports of BRBPR - 9.9 from 8.6  #Thrombocytopenia 74 from 57 from 74  #Coagulopathy - likely 2/2 hepatitis INR improving 2.05 from 2.22 from 1.9  #EtOH use - pt motivated to quit. Does not appear in w/d  #Hyponatremia 136 from 134 from 124 - correction over for Na 6 each 24h  #Morbid obesity 44.9    --c/w diuresis w lasix+Aldactone  --Repeat BMP to evaluate for error. If correction remains >6-8 would call renal  --F/U GI recs. c/w steroids. assess Lille score at day 4 (4/16)  --NPO after midnight for transjugular liver bx and wedge pressure afternoon 4/16) - will need monitoring 3h post procedure    DISPO: Home pending above. d/w housestaff, GI, and IR
reviewed pertinent data , h&p  patient seen and examined overnight     Denies urine sxs     PMHX: as above  PSg: none   FH dad with etoh abuse    SH: denies tobacco/drug use; + etoh abuse      Ciwa 0   Obese nad tired male,     mmm   S1S2   Lungs cta bl   Abd obese with moderate distension ,nontender  +1 ankle edema bl ,no lower ext tenderness b/l     1. ETOH hepatitis / acites in setting of ETOH abuse: c/w ceftriaxone followup ctxs; followup GI recs re: prednisolone use   and/or diuretcis;followup US doppler of abdomen; trending lactate; low CIWA,reports last drink on sunday; monitor ciwa  2. rectal bleeding:followup gi recs,monitor h/h       rest of  plan as above
Pt feels he slept much better since paracentesis. Denies abd pain, nausea, dyspnea. Still seeing red stools. Exam shows jaundice appearing male in NAD, no asterixis. A/O x3    #Acute alcoholic cirrhosis   - Volume - s/p 9L yesterday; 4.5L prior  #Normocytic anemia - 8.6 from 10.4  #Thrombocytopenia 57 from 74  #Coagulopathy - likely 2/2 hepatitis INR 2.22 from 1.9  #Hyponatremia 134 from 124     --Starting diuresis w 40 Lasix and 100 aldactone  --Repeat BMP to evaluate for error. If correction remains >6-8 would call renal  --F/U GI recs. c/w steroids. assess Lille score at day 4    DISPO: Home pending above
Pt seen and examined by me at bedside this AM. Agree with above and plan discussed with housestaff in detail.   Pt states able to ambulate to bathroom without Sob.   Decompensated cirrhosis 2/2 alcoholic hepatitis-pending IR therapeutic para today, SBP ruled out, started on prednisolone, likely not a candidate for diuretics 2/2 hyponatremia, trend Na for now. GI recs appreciated.   rest of a/p as above.

## 2021-04-15 NOTE — DIETITIAN NUTRITION RISK NOTIFICATION - PROVIDER ATTESTATION
By signing this assessment you are acknowledging and agree with the diagnosis/diagnoses assigned by the Registered Dietitian 1 Hour(s) 22 Minute(s)

## 2021-04-16 ENCOUNTER — RESULT REVIEW (OUTPATIENT)
Age: 46
End: 2021-04-16

## 2021-04-16 ENCOUNTER — TRANSCRIPTION ENCOUNTER (OUTPATIENT)
Age: 46
End: 2021-04-16

## 2021-04-16 VITALS
SYSTOLIC BLOOD PRESSURE: 124 MMHG | RESPIRATION RATE: 18 BRPM | HEART RATE: 78 BPM | TEMPERATURE: 98 F | DIASTOLIC BLOOD PRESSURE: 70 MMHG | OXYGEN SATURATION: 94 %

## 2021-04-16 LAB
ALBUMIN SERPL ELPH-MCNC: 3.2 G/DL — LOW (ref 3.3–5)
ALP SERPL-CCNC: 105 U/L — SIGNIFICANT CHANGE UP (ref 40–120)
ALT FLD-CCNC: 66 U/L — HIGH (ref 10–45)
ANION GAP SERPL CALC-SCNC: 10 MMOL/L — SIGNIFICANT CHANGE UP (ref 5–17)
APTT BLD: 35.6 SEC — HIGH (ref 27.5–35.5)
AST SERPL-CCNC: 143 U/L — HIGH (ref 10–40)
BASOPHILS # BLD AUTO: 0.01 K/UL — SIGNIFICANT CHANGE UP (ref 0–0.2)
BASOPHILS NFR BLD AUTO: 0.1 % — SIGNIFICANT CHANGE UP (ref 0–2)
BILIRUB SERPL-MCNC: 10.6 MG/DL — HIGH (ref 0.2–1.2)
BUN SERPL-MCNC: 14 MG/DL — SIGNIFICANT CHANGE UP (ref 7–23)
CALCIUM SERPL-MCNC: 8.1 MG/DL — LOW (ref 8.4–10.5)
CHLORIDE SERPL-SCNC: 99 MMOL/L — SIGNIFICANT CHANGE UP (ref 96–108)
CO2 SERPL-SCNC: 28 MMOL/L — SIGNIFICANT CHANGE UP (ref 22–31)
CREAT SERPL-MCNC: 0.72 MG/DL — SIGNIFICANT CHANGE UP (ref 0.5–1.3)
EOSINOPHIL # BLD AUTO: 0.02 K/UL — SIGNIFICANT CHANGE UP (ref 0–0.5)
EOSINOPHIL NFR BLD AUTO: 0.2 % — SIGNIFICANT CHANGE UP (ref 0–6)
GLUCOSE BLDC GLUCOMTR-MCNC: 78 MG/DL — SIGNIFICANT CHANGE UP (ref 70–99)
GLUCOSE BLDC GLUCOMTR-MCNC: 82 MG/DL — SIGNIFICANT CHANGE UP (ref 70–99)
GLUCOSE BLDC GLUCOMTR-MCNC: 85 MG/DL — SIGNIFICANT CHANGE UP (ref 70–99)
GLUCOSE SERPL-MCNC: 84 MG/DL — SIGNIFICANT CHANGE UP (ref 70–99)
HCT VFR BLD CALC: 29.3 % — LOW (ref 39–50)
HGB BLD-MCNC: 10 G/DL — LOW (ref 13–17)
IMM GRANULOCYTES NFR BLD AUTO: 0.6 % — SIGNIFICANT CHANGE UP (ref 0–1.5)
INR BLD: 1.98 — HIGH (ref 0.88–1.16)
LYMPHOCYTES # BLD AUTO: 1.29 K/UL — SIGNIFICANT CHANGE UP (ref 1–3.3)
LYMPHOCYTES # BLD AUTO: 13.1 % — SIGNIFICANT CHANGE UP (ref 13–44)
MAGNESIUM SERPL-MCNC: 2.1 MG/DL — SIGNIFICANT CHANGE UP (ref 1.6–2.6)
MCHC RBC-ENTMCNC: 32.6 PG — SIGNIFICANT CHANGE UP (ref 27–34)
MCHC RBC-ENTMCNC: 34.1 GM/DL — SIGNIFICANT CHANGE UP (ref 32–36)
MCV RBC AUTO: 95.4 FL — SIGNIFICANT CHANGE UP (ref 80–100)
MONOCYTES # BLD AUTO: 1.04 K/UL — HIGH (ref 0–0.9)
MONOCYTES NFR BLD AUTO: 10.6 % — SIGNIFICANT CHANGE UP (ref 2–14)
NEUTROPHILS # BLD AUTO: 7.39 K/UL — SIGNIFICANT CHANGE UP (ref 1.8–7.4)
NEUTROPHILS NFR BLD AUTO: 75.4 % — SIGNIFICANT CHANGE UP (ref 43–77)
NRBC # BLD: 0 /100 WBCS — SIGNIFICANT CHANGE UP (ref 0–0)
PHOSPHATE SERPL-MCNC: 3.4 MG/DL — SIGNIFICANT CHANGE UP (ref 2.5–4.5)
PLATELET # BLD AUTO: 80 K/UL — LOW (ref 150–400)
POTASSIUM SERPL-MCNC: 3.6 MMOL/L — SIGNIFICANT CHANGE UP (ref 3.5–5.3)
POTASSIUM SERPL-SCNC: 3.6 MMOL/L — SIGNIFICANT CHANGE UP (ref 3.5–5.3)
PROT SERPL-MCNC: 5.7 G/DL — LOW (ref 6–8.3)
PROTHROM AB SERPL-ACNC: 23 SEC — HIGH (ref 10.6–13.6)
RBC # BLD: 3.07 M/UL — LOW (ref 4.2–5.8)
RBC # FLD: 21.8 % — HIGH (ref 10.3–14.5)
SODIUM SERPL-SCNC: 137 MMOL/L — SIGNIFICANT CHANGE UP (ref 135–145)
WBC # BLD: 9.81 K/UL — SIGNIFICANT CHANGE UP (ref 3.8–10.5)
WBC # FLD AUTO: 9.81 K/UL — SIGNIFICANT CHANGE UP (ref 3.8–10.5)

## 2021-04-16 PROCEDURE — 82945 GLUCOSE OTHER FLUID: CPT

## 2021-04-16 PROCEDURE — 36011 PLACE CATHETER IN VEIN: CPT

## 2021-04-16 PROCEDURE — 85025 COMPLETE CBC W/AUTO DIFF WBC: CPT

## 2021-04-16 PROCEDURE — 83880 ASSAY OF NATRIURETIC PEPTIDE: CPT

## 2021-04-16 PROCEDURE — 84300 ASSAY OF URINE SODIUM: CPT

## 2021-04-16 PROCEDURE — 86665 EPSTEIN-BARR CAPSID VCA: CPT

## 2021-04-16 PROCEDURE — 75970 VASCULAR BIOPSY: CPT | Mod: 26

## 2021-04-16 PROCEDURE — U0005: CPT

## 2021-04-16 PROCEDURE — 80048 BASIC METABOLIC PNL TOTAL CA: CPT

## 2021-04-16 PROCEDURE — 80307 DRUG TEST PRSMV CHEM ANLYZR: CPT

## 2021-04-16 PROCEDURE — 37200 TRANSCATHETER BIOPSY: CPT

## 2021-04-16 PROCEDURE — 82784 ASSAY IGA/IGD/IGG/IGM EACH: CPT

## 2021-04-16 PROCEDURE — 86900 BLOOD TYPING SEROLOGIC ABO: CPT

## 2021-04-16 PROCEDURE — 99152 MOD SED SAME PHYS/QHP 5/>YRS: CPT

## 2021-04-16 PROCEDURE — C1729: CPT

## 2021-04-16 PROCEDURE — 87040 BLOOD CULTURE FOR BACTERIA: CPT

## 2021-04-16 PROCEDURE — 82570 ASSAY OF URINE CREATININE: CPT

## 2021-04-16 PROCEDURE — 99285 EMERGENCY DEPT VISIT HI MDM: CPT

## 2021-04-16 PROCEDURE — 83935 ASSAY OF URINE OSMOLALITY: CPT

## 2021-04-16 PROCEDURE — 86376 MICROSOMAL ANTIBODY EACH: CPT

## 2021-04-16 PROCEDURE — 87205 SMEAR GRAM STAIN: CPT

## 2021-04-16 PROCEDURE — 82042 OTHER SOURCE ALBUMIN QUAN EA: CPT

## 2021-04-16 PROCEDURE — 88307 TISSUE EXAM BY PATHOLOGIST: CPT | Mod: 26

## 2021-04-16 PROCEDURE — 82390 ASSAY OF CERULOPLASMIN: CPT

## 2021-04-16 PROCEDURE — 94660 CPAP INITIATION&MGMT: CPT

## 2021-04-16 PROCEDURE — 86695 HERPES SIMPLEX TYPE 1 TEST: CPT

## 2021-04-16 PROCEDURE — 82272 OCCULT BLD FECES 1-3 TESTS: CPT

## 2021-04-16 PROCEDURE — 85027 COMPLETE CBC AUTOMATED: CPT

## 2021-04-16 PROCEDURE — 76937 US GUIDE VASCULAR ACCESS: CPT

## 2021-04-16 PROCEDURE — 83036 HEMOGLOBIN GLYCOSYLATED A1C: CPT

## 2021-04-16 PROCEDURE — 83690 ASSAY OF LIPASE: CPT

## 2021-04-16 PROCEDURE — 86664 EPSTEIN-BARR NUCLEAR ANTIGEN: CPT

## 2021-04-16 PROCEDURE — U0003: CPT

## 2021-04-16 PROCEDURE — 88313 SPECIAL STAINS GROUP 2: CPT

## 2021-04-16 PROCEDURE — 83605 ASSAY OF LACTIC ACID: CPT

## 2021-04-16 PROCEDURE — C1769: CPT

## 2021-04-16 PROCEDURE — 85730 THROMBOPLASTIN TIME PARTIAL: CPT

## 2021-04-16 PROCEDURE — 86663 EPSTEIN-BARR ANTIBODY: CPT

## 2021-04-16 PROCEDURE — 71250 CT THORAX DX C-: CPT

## 2021-04-16 PROCEDURE — 82105 ALPHA-FETOPROTEIN SERUM: CPT

## 2021-04-16 PROCEDURE — 88313 SPECIAL STAINS GROUP 2: CPT | Mod: 26

## 2021-04-16 PROCEDURE — 76937 US GUIDE VASCULAR ACCESS: CPT | Mod: 26

## 2021-04-16 PROCEDURE — 86901 BLOOD TYPING SEROLOGIC RH(D): CPT

## 2021-04-16 PROCEDURE — 88307 TISSUE EXAM BY PATHOLOGIST: CPT

## 2021-04-16 PROCEDURE — P9047: CPT

## 2021-04-16 PROCEDURE — 93005 ELECTROCARDIOGRAM TRACING: CPT

## 2021-04-16 PROCEDURE — 36415 COLL VENOUS BLD VENIPUNCTURE: CPT

## 2021-04-16 PROCEDURE — 89051 BODY FLUID CELL COUNT: CPT

## 2021-04-16 PROCEDURE — 80053 COMPREHEN METABOLIC PANEL: CPT

## 2021-04-16 PROCEDURE — 87075 CULTR BACTERIA EXCEPT BLOOD: CPT

## 2021-04-16 PROCEDURE — 99153 MOD SED SAME PHYS/QHP EA: CPT

## 2021-04-16 PROCEDURE — 83735 ASSAY OF MAGNESIUM: CPT

## 2021-04-16 PROCEDURE — 82962 GLUCOSE BLOOD TEST: CPT

## 2021-04-16 PROCEDURE — 97161 PT EVAL LOW COMPLEX 20 MIN: CPT

## 2021-04-16 PROCEDURE — 83930 ASSAY OF BLOOD OSMOLALITY: CPT

## 2021-04-16 PROCEDURE — 84484 ASSAY OF TROPONIN QUANT: CPT

## 2021-04-16 PROCEDURE — 86645 CMV ANTIBODY IGM: CPT

## 2021-04-16 PROCEDURE — 82150 ASSAY OF AMYLASE: CPT

## 2021-04-16 PROCEDURE — 49083 ABD PARACENTESIS W/IMAGING: CPT

## 2021-04-16 PROCEDURE — 75970 VASCULAR BIOPSY: CPT

## 2021-04-16 PROCEDURE — 80074 ACUTE HEPATITIS PANEL: CPT

## 2021-04-16 PROCEDURE — 86038 ANTINUCLEAR ANTIBODIES: CPT

## 2021-04-16 PROCEDURE — 86850 RBC ANTIBODY SCREEN: CPT

## 2021-04-16 PROCEDURE — 93975 VASCULAR STUDY: CPT

## 2021-04-16 PROCEDURE — 82140 ASSAY OF AMMONIA: CPT

## 2021-04-16 PROCEDURE — 99239 HOSP IP/OBS DSCHRG MGMT >30: CPT | Mod: GC

## 2021-04-16 PROCEDURE — 86255 FLUORESCENT ANTIBODY SCREEN: CPT

## 2021-04-16 PROCEDURE — 83615 LACTATE (LD) (LDH) ENZYME: CPT

## 2021-04-16 PROCEDURE — 84157 ASSAY OF PROTEIN OTHER: CPT

## 2021-04-16 PROCEDURE — 84100 ASSAY OF PHOSPHORUS: CPT

## 2021-04-16 PROCEDURE — 82550 ASSAY OF CK (CPK): CPT

## 2021-04-16 PROCEDURE — 87070 CULTURE OTHR SPECIMN AEROBIC: CPT

## 2021-04-16 PROCEDURE — 85610 PROTHROMBIN TIME: CPT

## 2021-04-16 PROCEDURE — 87635 SARS-COV-2 COVID-19 AMP PRB: CPT

## 2021-04-16 PROCEDURE — 82248 BILIRUBIN DIRECT: CPT

## 2021-04-16 PROCEDURE — 80061 LIPID PANEL: CPT

## 2021-04-16 PROCEDURE — 74176 CT ABD & PELVIS W/O CONTRAST: CPT

## 2021-04-16 PROCEDURE — 81001 URINALYSIS AUTO W/SCOPE: CPT

## 2021-04-16 RX ORDER — FOLIC ACID 0.8 MG
1 TABLET ORAL
Qty: 30 | Refills: 0
Start: 2021-04-16 | End: 2021-05-15

## 2021-04-16 RX ORDER — PREDNISOLONE 5 MG
4 TABLET ORAL
Qty: 123 | Refills: 0
Start: 2021-04-16 | End: 2021-05-09

## 2021-04-16 RX ORDER — SPIRONOLACTONE 25 MG/1
1 TABLET, FILM COATED ORAL
Qty: 60 | Refills: 0
Start: 2021-04-16 | End: 2021-05-15

## 2021-04-16 RX ORDER — PANTOPRAZOLE SODIUM 20 MG/1
1 TABLET, DELAYED RELEASE ORAL
Qty: 30 | Refills: 0
Start: 2021-04-16 | End: 2021-05-15

## 2021-04-16 RX ORDER — FUROSEMIDE 40 MG
1 TABLET ORAL
Qty: 60 | Refills: 0
Start: 2021-04-16 | End: 2021-05-15

## 2021-04-16 RX ORDER — SPIRONOLACTONE 25 MG/1
1 TABLET, FILM COATED ORAL
Qty: 30 | Refills: 0
Start: 2021-04-16 | End: 2021-05-15

## 2021-04-16 RX ORDER — THIAMINE MONONITRATE (VIT B1) 100 MG
1 TABLET ORAL
Qty: 30 | Refills: 0
Start: 2021-04-16 | End: 2021-05-15

## 2021-04-16 RX ADMIN — PANTOPRAZOLE SODIUM 40 MILLIGRAM(S): 20 TABLET, DELAYED RELEASE ORAL at 06:34

## 2021-04-16 RX ADMIN — SERTRALINE 100 MILLIGRAM(S): 25 TABLET, FILM COATED ORAL at 11:13

## 2021-04-16 RX ADMIN — CHLORHEXIDINE GLUCONATE 1 APPLICATION(S): 213 SOLUTION TOPICAL at 06:35

## 2021-04-16 RX ADMIN — Medication 40 MILLIGRAM(S): at 18:01

## 2021-04-16 RX ADMIN — Medication 40 MILLIGRAM(S): at 18:33

## 2021-04-16 RX ADMIN — Medication 100 MILLIGRAM(S): at 12:48

## 2021-04-16 RX ADMIN — SPIRONOLACTONE 100 MILLIGRAM(S): 25 TABLET, FILM COATED ORAL at 07:11

## 2021-04-16 RX ADMIN — Medication 1 MILLIGRAM(S): at 11:14

## 2021-04-16 RX ADMIN — SPIRONOLACTONE 100 MILLIGRAM(S): 25 TABLET, FILM COATED ORAL at 18:33

## 2021-04-16 RX ADMIN — Medication 1 TABLET(S): at 11:13

## 2021-04-16 RX ADMIN — Medication 40 MILLIGRAM(S): at 06:34

## 2021-04-16 NOTE — PROGRESS NOTE ADULT - PROBLEM SELECTOR PLAN 6
RESOLVED- Hyponatremia likely hypervolemic hyponatremia  - continue to monitor  - urine Na, Cr, Osm Uses CPAP at home at bedtime, although does not know settings.    - CPAP on o/n

## 2021-04-16 NOTE — PROGRESS NOTE ADULT - PROBLEM SELECTOR PROBLEM 1
Ascites due to alcoholic hepatitis

## 2021-04-16 NOTE — DISCHARGE NOTE NURSING/CASE MANAGEMENT/SOCIAL WORK - PATIENT PORTAL LINK FT
You can access the FollowMyHealth Patient Portal offered by [facility] by registering at the following website: [email or web address]. By joining [facility]’s FollowMyHealth portal, you will also be able to view your health information using other applications (apps) compatible with our system.

## 2021-04-16 NOTE — DISCHARGE NOTE PROVIDER - CARE PROVIDER_API CALL
Sunny Cantor)  Gastroenterology; Internal Medicine  [address] [place] [address]  Phone: [phone]  Fax: [phone]  Follow Up Time:     Wally Driscoll)  Gastroenterology; Internal Medicine  [address], [place] [address]  Phone: [phone]  Fax: [phone]  Follow Up Time:

## 2021-04-16 NOTE — DISCHARGE NOTE PROVIDER - NSDCCPCAREPLAN_GEN_ALL_CORE_FT
PRINCIPAL DISCHARGE DIAGNOSIS  Diagnosis: Ascites due to alcoholic hepatitis  Assessment and Plan of Treatment: You presented to the hospital and were found to have alcoholic hepatitis. This is liver inflammation caused by drinking too much alcohol.  Alcoholic hepatitis can occur in people who drink heavily for many years.  Symptoms include yellow skin and eyes along with increasing belly size due to fluid accumulation. Treatment involves hydration, nutritional care, and stopping alcohol use. Steroid drugs can help reduce liver inflammation. While you were in the hospital, you had about 14L of fluid removed from your abdomen. We have also started you on pills to help remove water from your body, these are LASIX and SPIRONOLACTONE. Please take LASIX (FUROSEMIDE) 40mg every 12 hours daily and SPIRONOLACTONE 100mg daily. While on these medications it is also important to see a physician and follow-up your potassium levels as these can be affected. Furthermore, you were also started on steroids to help with your symptoms. This medications is called PREDNISOLONE. Please continue these steroids for about 6 weeks as instructed by the pharmacy medicine instructions.      SECONDARY DISCHARGE DIAGNOSES  Diagnosis: Rectal bleeding  Assessment and Plan of Treatment: You intially presented to the hospital because you had blood in your stool. You were being followed by the gastroenterologists in the hospital. They will follow-up with you outpatient by calling you and scheduling an appointment. You will also likely have a camera inserted into your intestines to evaluate the cause of your bleeding done in an outpatient setting. Please return to the hospital if you notice more blood in your stool, feel weak, or have any other alarming symptoms.     PRINCIPAL DISCHARGE DIAGNOSIS  Diagnosis: Ascites due to alcoholic hepatitis  Assessment and Plan of Treatment: You presented to the hospital and were found to have alcoholic hepatitis. This is liver inflammation caused by drinking too much alcohol.  Alcoholic hepatitis can occur in people who drink heavily for many years.  Symptoms include yellow skin and eyes along with increasing belly size due to fluid accumulation. Treatment involves hydration, nutritional care, and stopping alcohol use. Steroid drugs can help reduce liver inflammation. While you were in the hospital, you had about 14L of fluid removed from your abdomen. You also had a biopsy done of your liver that you will need to follow outpatient with Dr. Driscoll. His office will call you to schedule an appointment. We have also started you on pills to help remove water from your body, these are LASIX and SPIRONOLACTONE. Please take LASIX (FUROSEMIDE) 40mg every 12 hours daily and SPIRONOLACTONE 100mg daily. While on these medications it is also important to see a physician and follow-up your potassium levels as these can be affected. Furthermore, you were also started on steroids to help with your symptoms. This medications is called PREDNISOLONE. Please continue these steroids for about 6 weeks as instructed by the pharmacy medicine instructions.      SECONDARY DISCHARGE DIAGNOSES  Diagnosis: Rectal bleeding  Assessment and Plan of Treatment: You intially presented to the hospital because you had blood in your stool. You were being followed by the gastroenterologists in the hospital. They will follow-up with you outpatient by calling you and scheduling an appointment. You will also likely have a camera inserted into your intestines to evaluate the cause of your bleeding done in an outpatient setting. Please return to the hospital if you notice more blood in your stool, feel weak, or have any other alarming symptoms.

## 2021-04-16 NOTE — PROGRESS NOTE ADULT - SUBJECTIVE AND OBJECTIVE BOX
OVERNIGHT EVENTS: Patient had >700cc urine overnight    SUBJECTIVE / INTERVAL HPI: Patient seen and examined at bedside. Patient states that he feels okay. Has been urinating more but still feels "full" for now. Denies SOB, CP, confusion, dizziness, headache.     VITAL SIGNS:  Vital Signs Last 24 Hrs  T(C): 36.7 (16 Apr 2021 06:47), Max: 36.7 (15 Apr 2021 12:41)  T(F): 98.1 (16 Apr 2021 06:47), Max: 98.1 (16 Apr 2021 06:47)  HR: 74 (16 Apr 2021 06:47) (74 - 82)  BP: 114/67 (16 Apr 2021 06:47) (114/65 - 146/80)  BP(mean): --  RR: 18 (16 Apr 2021 06:47) (18 - 18)  SpO2: 90% (16 Apr 2021 06:47) (90% - 98%)    PHYSICAL EXAM:    GENERAL: NAD, lying in bed comfortably  HEAD:  Atraumatic, Normocephalic  EYES: EOMI, PERRLA, scleral icterus  CHEST/LUNG: Clear to auscultation bilaterally; No rales, rhonchi, wheezing, or rubs. Unlabored respirations  HEART: Regular rate and rhythm; No murmurs, rubs, or gallops  ABDOMEN: BSx4; distended, nontender, no spider angiomas, no varicosities, mild induration around umbilicus   EXTREMITIES:  2+ Peripheral Pulses, brisk capillary refill. 2+ pitting edema b/l to knees  NERVOUS SYSTEM:  A&Ox3, no focal deficits     MEDICATIONS:  MEDICATIONS  (STANDING):  chlorhexidine 4% Liquid 1 Application(s) Topical <User Schedule>  folic acid 1 milliGRAM(s) Oral daily  furosemide    Tablet 40 milliGRAM(s) Oral every 12 hours  insulin lispro (ADMELOG) corrective regimen sliding scale   SubCutaneous Before meals and at bedtime  multivitamin 1 Tablet(s) Oral daily  pantoprazole    Tablet 40 milliGRAM(s) Oral before breakfast  prednisoLONE Syrup 3 mG/mL (PRELONE) 40 milliGRAM(s) Oral every 24 hours  propranolol 20 milliGRAM(s) Oral every 12 hours  sertraline 100 milliGRAM(s) Oral daily  spironolactone 100 milliGRAM(s) Oral every 12 hours  thiamine 100 milliGRAM(s) Oral every 24 hours    MEDICATIONS  (PRN):  LORazepam   Injectable 1 milliGRAM(s) IV Push every 2 hours PRN CIWA-Ar score increase by 2 points and a total score of 7 or less      ALLERGIES:  Allergies    No Known Allergies    Intolerances        LABS:                        9.9    9.60  )-----------( 74       ( 15 Apr 2021 10:06 )             29.3     04-15    136  |  97  |  11  ----------------------------<  99  3.9   |  28  |  0.70    Ca    8.3<L>      15 Apr 2021 10:06  Phos  3.3     04-15  Mg     2.1     04-15    TPro  5.9<L>  /  Alb  3.5  /  TBili  12.0<H>  /  DBili  x   /  AST  160<H>  /  ALT  65<H>  /  AlkPhos  113  04-15    PT/INR - ( 15 Apr 2021 10:06 )   PT: 23.8 sec;   INR: 2.05          PTT - ( 15 Apr 2021 10:06 )  PTT:36.2 sec    CAPILLARY BLOOD GLUCOSE      POCT Blood Glucose.: 78 mg/dL (16 Apr 2021 08:13)      RADIOLOGY & ADDITIONAL TESTS: Reviewed.     OVERNIGHT EVENTS: Patient had >700cc urine overnight    SUBJECTIVE / INTERVAL HPI: Patient seen and examined at bedside. Patient states that he feels okay. Has been urinating more but still feels "full" for now. States he has had several mild bloody BM. Denies SOB, CP, confusion, dizziness, headache.     VITAL SIGNS:  Vital Signs Last 24 Hrs  T(C): 36.7 (16 Apr 2021 06:47), Max: 36.7 (15 Apr 2021 12:41)  T(F): 98.1 (16 Apr 2021 06:47), Max: 98.1 (16 Apr 2021 06:47)  HR: 74 (16 Apr 2021 06:47) (74 - 82)  BP: 114/67 (16 Apr 2021 06:47) (114/65 - 146/80)  BP(mean): --  RR: 18 (16 Apr 2021 06:47) (18 - 18)  SpO2: 90% (16 Apr 2021 06:47) (90% - 98%)    PHYSICAL EXAM:    GENERAL: NAD, lying in bed comfortably  HEAD:  Atraumatic, Normocephalic  EYES: EOMI, PERRLA, scleral icterus  CHEST/LUNG: Clear to auscultation bilaterally; No rales, rhonchi, wheezing, or rubs. Unlabored respirations  HEART: Regular rate and rhythm; No murmurs, rubs, or gallops  ABDOMEN: BSx4; distended, nontender, no spider angiomas, no varicosities, mild induration around umbilicus   EXTREMITIES:  2+ Peripheral Pulses, brisk capillary refill. 2+ pitting edema b/l to knees  NERVOUS SYSTEM:  A&Ox3, no focal deficits     MEDICATIONS:  MEDICATIONS  (STANDING):  chlorhexidine 4% Liquid 1 Application(s) Topical <User Schedule>  folic acid 1 milliGRAM(s) Oral daily  furosemide    Tablet 40 milliGRAM(s) Oral every 12 hours  insulin lispro (ADMELOG) corrective regimen sliding scale   SubCutaneous Before meals and at bedtime  multivitamin 1 Tablet(s) Oral daily  pantoprazole    Tablet 40 milliGRAM(s) Oral before breakfast  prednisoLONE Syrup 3 mG/mL (PRELONE) 40 milliGRAM(s) Oral every 24 hours  propranolol 20 milliGRAM(s) Oral every 12 hours  sertraline 100 milliGRAM(s) Oral daily  spironolactone 100 milliGRAM(s) Oral every 12 hours  thiamine 100 milliGRAM(s) Oral every 24 hours    MEDICATIONS  (PRN):  LORazepam   Injectable 1 milliGRAM(s) IV Push every 2 hours PRN CIWA-Ar score increase by 2 points and a total score of 7 or less      ALLERGIES:  Allergies    No Known Allergies    Intolerances        LABS:                        9.9    9.60  )-----------( 74       ( 15 Apr 2021 10:06 )             29.3     04-15    136  |  97  |  11  ----------------------------<  99  3.9   |  28  |  0.70    Ca    8.3<L>      15 Apr 2021 10:06  Phos  3.3     04-15  Mg     2.1     04-15    TPro  5.9<L>  /  Alb  3.5  /  TBili  12.0<H>  /  DBili  x   /  AST  160<H>  /  ALT  65<H>  /  AlkPhos  113  04-15    PT/INR - ( 15 Apr 2021 10:06 )   PT: 23.8 sec;   INR: 2.05          PTT - ( 15 Apr 2021 10:06 )  PTT:36.2 sec    CAPILLARY BLOOD GLUCOSE      POCT Blood Glucose.: 78 mg/dL (16 Apr 2021 08:13)      RADIOLOGY & ADDITIONAL TESTS: Reviewed.

## 2021-04-16 NOTE — PROGRESS NOTE ADULT - REASON FOR ADMISSION
bright red blood per rectum

## 2021-04-16 NOTE — DISCHARGE NOTE PROVIDER - HOSPITAL COURSE
#Discharge: do not delete    Patient is __ yo M/F with past medical history of _____ admitted for _____, found to have _____      Problem List/Main Diagnoses (system-based):       Inpatient treatment course:      New medications:     Labs to be followed outpatient:     Exam to be followed outpatient: #Discharge: do not delete    44yo M PMHx alcohol abuse, anxiety, depression, HLD, MICHELLE (on CPAP), presenting due to intermittent bloody stool X1 month. In the few days before presentation the bloody stool worsened, and had multiple episodes a day. He became lightheaded, fell, and was unable to get up, prompting  to call 911. Denied loss of consciousness or hitting his head.     States he has increased his alcohol consumption since the beginning of the pandemic, citing a few drinks of seltzer, vodka, and proseco each per day. Last drink was 4/11 at 9 pm. Has never been hospitalized for alcohol intoxication or withdrawal. He denies having experienced any withdrawal seizures or hallucinations, although does have morning tremors that resolve with alcohol consumption.  He denies a history of hemorrhoids or any abdominal disorders/diseases. Denies sick contacts. Occasionally takes 2 softgels of Advil for headaches and back pain.       Problem List/Main Diagnoses (system-based):       Inpatient treatment course:   During admission patient was treated for alcoholic hepatitis. GI consulted. Started on prednisolone with conjunctive insulin, vitamin supplementation, and furosemide and spironolactone. Home propranolol and sertraline was also continued. PT consulted and determined home dispo without home PT. Transjugular liver biopsy with wedge pressure completed.        New medications:       Labs to be followed outpatient:       Exam to be followed outpatient:   GI followup. PCP followup. #Discharge: do not delete    46yo M PMHx alcohol abuse, anxiety, depression, HLD, MICHELLE (on CPAP), presenting due to intermittent bloody stool X1 month. In the few days before presentation the bloody stool worsened, and had multiple episodes a day. He became lightheaded, fell, and was unable to get up, prompting  to call 911. Denied loss of consciousness or hitting his head. States he has increased his alcohol consumption since the beginning of the pandemic, citing a few drinks of seltzer, vodka, and proseco each per day. Last drink was 4/11 at 9 pm. Has never been hospitalized for alcohol intoxication or withdrawal. He denies having experienced any withdrawal seizures or hallucinations, although does have morning tremors that resolve with alcohol consumption.  He denies a history of hemorrhoids or any abdominal disorders/diseases. Denies sick contacts. Occasionally takes 2 softgels of Advil for headaches and back pain.     CT chest/abdomen impression: Hepatic steatosis. Mild splenomegaly. Abdominal varicose veins. Large ascites. Mesenteric edema. Extensive subcutaneous edema in the anterior and chest wall.      Problem List/Main Diagnoses (system-based):       Inpatient treatment course:   During admission patient was treated for alcoholic hepatitis. GI consulted. Started on prednisolone with conjunctive insulin, vitamin supplementation, and furosemide and spironolactone. Home propranolol and sertraline was also continued. PT consulted and determined home dispo without home PT. Transjugular liver biopsy with wedge pressure completed.        New medications:       Labs to be followed outpatient:       Exam to be followed outpatient:   GI followup. PCP followup. #Discharge: do not delete    46yo M PMHx alcohol abuse, anxiety, depression, HLD, MICHELLE (on CPAP), presenting due to intermittent bloody stool X1 month. In the few days before presentation the bloody stool worsened, and had multiple episodes a day. He became lightheaded, fell, and was unable to get up, prompting  to call 911. Denied loss of consciousness or hitting his head. States he has increased his alcohol consumption since the beginning of the pandemic, citing a few drinks of seltzer, vodka, and proseco each per day. Last drink was 4/11 at 9 pm. Has never been hospitalized for alcohol intoxication or withdrawal. He denies having experienced any withdrawal seizures or hallucinations, although does have morning tremors that resolve with alcohol consumption.  He denies a history of hemorrhoids or any abdominal disorders/diseases. Denies sick contacts. Occasionally takes 2 softgels of Advil for headaches and back pain.     CT chest/abdomen impression: Hepatic steatosis. Mild splenomegaly. Abdominal varicose veins. Large ascites. Mesenteric edema. Extensive subcutaneous edema in the anterior and chest wall.      Problem List/Main Diagnoses (system-based):       Inpatient treatment course:   During admission patient was treated for alcoholic hepatitis and significant ascites. GI consulted. Paracentesis x2 yielded 13.8L fluid. Negative for infection. Started on prednisolone with conjunctive insulin, vitamin supplementation, and furosemide and spironolactone. Home propranolol and sertraline was also continued. PT consulted and determined home dispo without home PT. Transjugular liver biopsy with wedge pressure completed.        New medications:       Labs to be followed outpatient:       Exam to be followed outpatient:   GI followup. PCP followup. #Discharge: do not delete    46yo M PMHx alcohol abuse, anxiety, depression, HLD, MICHELLE (on CPAP), presenting due to intermittent bloody stool X1 month. In the few days before presentation the bloody stool worsened, and had multiple episodes a day. He became lightheaded, fell, and was unable to get up, prompting  to call 911. Denied loss of consciousness or hitting his head. States he has increased his alcohol consumption since the beginning of the pandemic, citing a few drinks of seltzer, vodka, and proseco each per day. Last drink was 4/11 at 9 pm. Has never been hospitalized for alcohol intoxication or withdrawal. He denies having experienced any withdrawal seizures or hallucinations, although does have morning tremors that resolve with alcohol consumption.  He denies a history of hemorrhoids or any abdominal disorders/diseases. Denies sick contacts. Occasionally takes 2 softgels of Advil for headaches and back pain.     CT chest/abdomen impression: Hepatic steatosis. Mild splenomegaly. Abdominal varicose veins. Large ascites. Mesenteric edema. Extensive subcutaneous edema in the anterior and chest wall.      Problem List/Main Diagnoses (system-based):    1. Ascites due to alcoholic hepatitis.  Plan: Patient with excessive alcohol use and increasing quantities since pandemic began.  Supporting clinical factors include jaundice and scleral icterus upon exam, initial AST//101, prolonged INR, elevated T bili (11.0), and leukocytosis.  JHON elevated.  CT abd with identification of large volume abdominal ascites.  - GI consulted, appreciate recs. Not starting lactulose/rifamixin due to no hepatic encephalopathy. Will reassess diuretic need. Started on prednisolone.   - s/p therapeutic paracentesis 4/12 and 4/13. Total of 13.8L drained. Continue monitoring.   - MVI, thiamine, folic acid  - vitamin K challenge 10 mg IV daily x3 days (4/12-4/15)  - f/u Acute Hepatitis Panel, antinuclear antibody, antismooth muscle antibody, anti-liver/kidney microsomal antibody type 1, immunoglobulin level, EBV serologies, CMV IgM, HSV IgM, Ceruloplasmin Level  - Check CBC, Coags, CMP daily  - C/w Lasix 40mg BID and spironolactone 200 QD with increased UOP  - Plan for transjugular bx of liver with wedge pressure today.    2. BRBPR- Likely from portal pressure 2/2 underlying liver disease.  Patient reports many bloody BMs with increasing frequency over the past month.   -hold DVT prophylaxis  -monitor CBC  -maintain active T&S  -transfuse if Hgb <7 or HD unstable.     3.  Alcohol abuse.  Plan: Last drink was 4/11 at 9pm.  Patient open for counseling during this admission.  - d/c CIWA protocol  - no concern for withdrawal at this time  - brought to ED 2/2 inability to get up after fall. PT consulted for evaluation of fall risk.     Inpatient treatment course:   During admission patient was treated for alcoholic hepatitis and significant ascites. GI consulted. Paracentesis x2 yielded 13.8L fluid. Negative for infection. Started on prednisolone with conjunctive insulin, vitamin supplementation, and furosemide and spironolactone. Home propranolol and sertraline was also continued. PT consulted and determined home dispo without home PT. Transjugular liver biopsy with wedge pressure completed.        New medications: Spironolactone 100 qd, Lasix 40 bid, Thiamine, folate, multivitamin, protonix,        Labs to be followed outpatient:       Exam to be followed outpatient:   GI followup. PCP followup. #Discharge: do not delete    46yo M PMHx alcohol abuse, anxiety, depression, HLD, MICHELLE (on CPAP), presenting due to intermittent bloody stool X1 month. In the few days before presentation the bloody stool worsened, and had multiple episodes a day. He became lightheaded, fell, and was unable to get up, prompting  to call 911. Denied loss of consciousness or hitting his head. States he has increased his alcohol consumption since the beginning of the pandemic, citing a few drinks of seltzer, vodka, and proseco each per day. Last drink was 4/11 at 9 pm. Has never been hospitalized for alcohol intoxication or withdrawal. He denies having experienced any withdrawal seizures or hallucinations, although does have morning tremors that resolve with alcohol consumption.  He denies a history of hemorrhoids or any abdominal disorders/diseases. Denies sick contacts. Occasionally takes 2 softgels of Advil for headaches and back pain.     Problem List/Main Diagnoses (system-based):    1. Ascites due to alcoholic hepatitis.  Plan: Patient with excessive alcohol use and increasing quantities since pandemic began.  Supporting clinical factors include jaundice and scleral icterus upon exam, initial AST//101, prolonged INR, elevated T bili (11.0), and leukocytosis.  JHON elevated.  CT abd with identification of large volume abdominal ascites.  - CT chest/abdomen impression: Hepatic steatosis. Mild splenomegaly. Abdominal varicose veins. Large ascites. Mesenteric edema. Extensive subcutaneous edema in the anterior and chest wall.  - GI following  - Patient not requiring lactulose/rifamixin due to no hepatic encephalopathy  - Started on prednisolone 40mg for 28 days with 16 day taper  - s/p therapeutic paracentesis 4/12 and 4/13. Total of 13.8L drained  - Started on multivitamin, folate, and thiamine  - vitamin K challenge 10 mg IV daily x3 days (4/12-4/15)  - C/w Lasix 40mg BID and spironolactone 100 QD   - s/p transjugular hepatic bx with wedge pressure     2. BRBPR- Likely from portal pressure 2/2 underlying liver disease.  Patient reports many bloody BMs with increasing frequency over the past month.   - held DVT prophylaxis  - will get scoped outpatient  - Hgb remained stable    3.  Alcohol abuse.  Plan: Last drink was 4/11 at 9pm.  Patient open for counseling during this admission.  - did not withdraw  - pt interested in outpatient rehab program    Inpatient treatment course:   During admission patient was treated for alcoholic hepatitis and significant ascites. GI consulted. Paracentesis x2 yielded 13.8L fluid. Negative for infection. Started on prednisolone with conjunctive insulin, vitamin supplementation, and furosemide and spironolactone. Home propranolol and sertraline was also continued. PT consulted and determined home dispo without home PT. Transjugular liver biopsy with wedge pressure completed.        New medications: Spironolactone 100 qd, Lasix 40 bid, Thiamine, folate, multivitamin, protonix,      Labs to be followed outpatient: Potassium, - f/u Acute Hepatitis Panel, antinuclear antibody, antismooth muscle antibody, anti-liver/kidney microsomal antibody type 1, immunoglobulin level, EBV serologies, CMV IgM, HSV IgM, Ceruloplasmin Level    Exam to be followed outpatient: results of transjugular biopsy with wedge pressure    GI followup. PCP followup.

## 2021-04-16 NOTE — DISCHARGE NOTE PROVIDER - CARE PROVIDERS DIRECT ADDRESSES
,[email or web address]

## 2021-04-16 NOTE — PROGRESS NOTE ADULT - PROBLEM SELECTOR PLAN 5
Although patient may have SBP, suspicion for underlying infection remains low.  RESOLVED- Elevated lactate upon admission likely 2/2 poor liver function and adequate clearance. Lactate downtrending from 6.3-->4.7-->3.5  -trend to clearance Hx of anxiety on propranolol 20mg BID  - restarted propranolol; not concerned for GI bleed at this time     #Depression  On zoloft 100mg QD  -c/w home med

## 2021-04-16 NOTE — PROGRESS NOTE ADULT - PROBLEM SELECTOR PLAN 7
Hx of anxiety on propranolol 20mg BID  - restarted propranolol; not concerned for GI bleed at this time     #Depression  On zoloft 100mg QD  -c/w home med F: None  E: Replete PRN  N: NPO after midnight  DVT proph: none, i/s/o GIB  GI proph: Protonix 40mg QD  Lines: peripheral  Code Status: Full Code

## 2021-04-16 NOTE — DISCHARGE NOTE PROVIDER - NSDCMRMEDTOKEN_GEN_ALL_CORE_FT
propranolol 20 mg oral tablet: 1 tab(s) orally 2 times a day  Zoloft 100 mg oral tablet: 1 tab(s) orally once a day   Aldactone 100 mg oral tablet: 1 tab(s) orally once a day   folic acid 1 mg oral tablet: 1 tab(s) orally once a day  furosemide 40 mg oral tablet: 1 tab(s) orally every 12 hours  Multiple Vitamins oral tablet: 1 tab(s) orally once a day  Orapred ODT 10 mg oral tablet, disintegrating: Please take FOUR (4) tablets DAILY from 4/17 until 5/9.   Then, take THREE (3) tablets for 4 days DAILY from 5/10 until 5/13.   Then, take TWO (2) tablets DAILY for 4 days  from 5/14 until 5/17.   Then, take ONE (1) tablet DAILY for 4 days from 5/18 until 5/21.   Then, take HALF a tablet DAILY for 3 days from 5/22 until 5/24.     M2B  7 URIS 7621-01  D/C 4/16/21 @ 6PM  [place] [phone]    Please M2B all medications, thank you!  pantoprazole 40 mg oral delayed release tablet: 1 tab(s) orally once a day (before a meal)  propranolol 20 mg oral tablet: 1 tab(s) orally 2 times a day  thiamine 100 mg oral tablet: 1 tab(s) orally every 24 hours  Zoloft 100 mg oral tablet: 1 tab(s) orally once a day

## 2021-04-16 NOTE — PROGRESS NOTE ADULT - PROBLEM SELECTOR PROBLEM 9
Nutrition, metabolism, and development symptoms
Nutrition, metabolism, and development symptoms
MICHELLE on CPAP
Lumbar disc herniation
Nutrition, metabolism, and development symptoms

## 2021-04-16 NOTE — PROGRESS NOTE ADULT - PROBLEM SELECTOR PLAN 9
F: None  E: Replete PRN  N: NPO after midnight  DVT proph: none, i/s/o GIB  GI proph: Protonix 40mg QD  Lines: peripheral  Code Status: Full Code
not active  - prn pain control
Uses CPAP at home at bedtime, although does not know settings.    -obtain collateral from  about home setting  -CPAP while inpatient
F: None  E: Replete PRN  N: NPO after midnight  DVT proph: none, i/s/o GIB  GI proph: Protonix 40mg QD  Lines: peripheral  Code Status: Full Code
Diet: regular diet with ensure   DVT proph: none, i/s/o GIB  GI proph: Protonix 40mg QD  Lines: peripheral  Code Status: Full Code

## 2021-04-16 NOTE — PROGRESS NOTE ADULT - NUTRITIONAL ASSESSMENT
This patient has been assessed with a concern for Malnutrition and has been determined to have a diagnosis/diagnoses of Morbid obesity (BMI > 40).    This patient is being managed with:   Diet NPO after Midnight-     NPO Start Date: 15-Apr-2021   NPO Start Time: 23:59  Entered: Apr 15 2021  3:52PM    Diet Regular-  Supplement Feeding Modality:  Oral  Ensure High Protein Cans or Servings Per Day:  1       Frequency:  Daily  Entered: Apr 13 2021 10:11AM    
This patient has been assessed with a concern for Malnutrition and has been determined to have a diagnosis/diagnoses of Morbid obesity (BMI > 40).    This patient is being managed with:   Diet NPO after Midnight-     NPO Start Date: 15-Apr-2021   NPO Start Time: 23:59  Entered: Apr 15 2021  3:52PM    Diet Regular-  Supplement Feeding Modality:  Oral  Ensure High Protein Cans or Servings Per Day:  1       Frequency:  Daily  Entered: Apr 13 2021 10:11AM

## 2021-04-16 NOTE — CONSULT NOTE ADULT - ASSESSMENT
Assessment: 44yo M PMHx alcohol abuse, anxiety, depression, HLD, MICHELLE (on CPAP), and herniated disks presents for bloody stool X1 month and distended abdomen likely 2/2 alcoholic hepatitis now s/p diagnostic and therapeutic paracentesis. S/p 4.7L bedside paracentesis, per team, remaining large volume ascites not able to be drained due to limited collection canisters. Now for paracentesis.  Case reviewed with Dr. Ledezma, plan for procedure with ultrasound guidance.        Communicated with: primary team      
Assessment: 44yo M PMHx alcohol abuse, anxiety, depression, HLD, MICHELLE (on CPAP), and herniated disks presents for bloody stool X1 month and distended abdomen likely 2/2 alcoholic hepatitis now s/p diagnostic and therapeutic paracentesis. GI recommending transjugular liver biopsy w/ wedge pressures to ascertain degree of liver damage and prognosticate future liver care. Case reviewed with Dr. Pool, will plan for procedure with sedation.    Recommendations: NPO at midnight prior to procedure with sedation.       Communicated with: Dr. Wagoner    
44yo M PMHx alcohol abuse, anxiety, depression, HLD, MICHELLE (on CPAP), and herniated disks presents for bloody stool X1 month. GI consulted for c/f alc hep.     #R/o alcoholic hepatitis  - would begin by r/o infectious etiologies w/ blood cx, CXR, UA, and diagnostic paracentesis  - no imaging e/o cirrhosis, though significant steatosis and inflammation can obfuscate this picture  - if cirrhotic, MELD-Na 30   - if alc hep, DF approximately 64, so if infectious w/u negative, would begin prednisolone  - MVI, thiamine, folic acid  - EtOH cessation counseling provided  - f/u diagnostic and therapeutic paracentesis; replete w/ albumin  - will determine diuretics in coming days  - vitamin K challenge 10 mg IV daily x3 days  - no s/s hepatic encephalopathy at this time so no role for lactulose/rifaximin  - obtain primary liver disease laboratory w/u: Acute Hepatitis Panel, antinuclear antibody, antismooth muscle antibody, anti-liver/kidney microsomal antibody type 1, immunoglobulin level, EBV serologies, CMV IgM, HSV IgM, Ceruloplasmin Level  - Check CBC, Coags, CMP daily  - Supportive care per primary team  - monitor for w/drawal symptoms    Jess Kurtz MD  PGY-4, Gastroenterology Fellow  pager: [phone]

## 2021-04-16 NOTE — PROGRESS NOTE ADULT - PROBLEM SELECTOR PLAN 3
New onset abdominal distention i/s/o significant alcohol use.  CT abdomen results as follows: Hepatic steatosis. Mild splenomegaly. Abdominal varicose veins. Large ascites. Mesenteric edema. Extensive subcutaneous edema in the anterior and chest wall.   - 25 point MELD score today  - 58 MADRI score today  -RUQ U/S indeterminate     #R/O hepatic vein thrombosis  -abd US indeterminate     #Hepatic steatosis  Likely 2/2 alcohol abuse  -Identified on CT Abd New onset abdominal distention i/s/o significant alcohol use.  CT abdomen results as follows: Hepatic steatosis. Mild splenomegaly. Abdominal varicose veins. Large ascites. Mesenteric edema. Extensive subcutaneous edema in the anterior and chest wall.   - Lille score 0.107 pts = 6 month survival of 85%   - 23 point MELD score today  - 53.8 MADRI score today  -RUQ U/S indeterminate     #R/O hepatic vein thrombosis  -abd US indeterminate     #Hepatic steatosis  Likely 2/2 alcohol abuse  -Identified on CT Abd

## 2021-04-16 NOTE — CONSULT NOTE ADULT - SUBJECTIVE AND OBJECTIVE BOX
46yo M PMHx alcohol abuse, anxiety, depression, HLD, MICHELLE (on CPAP), and herniated disks presents for bloody stool X1 month and distended abdomen likely 2/2 alcoholic hepatitis now s/p diagnostic and therapeutic paracentesis. GI recommending transjugular liver biopsy due to elevated INR w/ wedge pressures to ascertain degree of liver damage and prognosticate future liver care. IR consulted for biopsy.       Clinical History: BLOOD IN STOOL    FH: CAD (coronary artery disease)    FH: lung cancer (Father)    Family history of liver transplant (Father)    FH: depression    Handoff    MEWS Score    No pertinent past medical history    Anxiety    Depression    Lumbar disc herniation    HLD (hyperlipidemia)    MICHELLE on CPAP    Alcohol abuse    Ascites due to alcoholic hepatitis    Ascites due to alcoholic hepatitis    Rectal bleeding    Alcohol abuse    Lactic acidosis    Hyponatremia    Leukocytosis    MICHELLE on CPAP    Lumbar disc herniation    Nutrition, metabolism, and development symptoms    Anxiety    BLOOD IN STOOL    Cirrhosis    SysAdmin_VisitLink        Meds:chlorhexidine 4% Liquid 1 Application(s) Topical <User Schedule>  folic acid 1 milliGRAM(s) Oral daily  furosemide    Tablet 40 milliGRAM(s) Oral every 12 hours  insulin lispro (ADMELOG) corrective regimen sliding scale   SubCutaneous Before meals and at bedtime  LORazepam   Injectable 1 milliGRAM(s) IV Push every 2 hours PRN  multivitamin 1 Tablet(s) Oral daily  pantoprazole    Tablet 40 milliGRAM(s) Oral before breakfast  prednisoLONE Syrup 3 mG/mL (PRELONE) 40 milliGRAM(s) Oral every 24 hours  propranolol 20 milliGRAM(s) Oral every 12 hours  sertraline 100 milliGRAM(s) Oral daily  spironolactone 100 milliGRAM(s) Oral every 12 hours  thiamine 100 milliGRAM(s) Oral every 24 hours      Allergies:No Known Allergies        Labs:                           10.0   9.81  )-----------( 80       ( 16 Apr 2021 08:57 )             29.3     PT/INR - ( 16 Apr 2021 09:05 )   PT: 23.0 sec;   INR: 1.98          PTT - ( 16 Apr 2021 09:05 )  PTT:35.6 sec  04-16    137  |  99  |  14  ----------------------------<  84  3.6   |  28  |  0.72    Ca    8.1<L>      16 Apr 2021 08:57  Phos  3.4     04-16  Mg     2.1     04-16    TPro  5.7<L>  /  Alb  3.2<L>  /  TBili  10.6<H>  /  DBili  x   /  AST  143<H>  /  ALT  66<H>  /  AlkPhos  105  04-16          Imaging Findings: reviewed        
GASTROENTEROLOGY CONSULT NOTE  HPI:  44yo M PMHx alcohol abuse, anxiety, depression, HLD, MICHELLE (on CPAP), and herniated disks presents for bloody stool X1 month.  About one month ago, patient started noticing he was having increasing frequency of bowel movements, many of which were mixed with blood.  Over the past few days, it has worsened, and he has been waking up every 2 hours in the middle of the night with bloody stools.  Last night, the patient was drinking alcohol and had another bloody bowel movement, some of which ended up on the floor because he couldn't make it to the bathroom.  When he bent down to clean up the mess, he became lightheaded and his  called 911, who brought him to [facility].  Although lightheaded, patient denies having any loss of consciousness or hitting his head.  Regarding his alcohol consumption, patient reports that he has been drinking more since the beginning of the pandemic, although unable to quantify amount during the interview.  He reports his last drink to be last night at 9 pm.  Upon ROS, patient states that he has had increasing shortness of breath, abdominal distension, generalized abdominal discomfort, and nausea with retching (no elijah vomitus or blood) over the past few weeks.  He has also had swelling of his legs for the past week, which he has never experienced previously.  He states "he has been spiraling" lately and has recently started therapy with a nurse practitioner who prescribed him zoloft and propranolol for anxiety and depression.  He has never been hospitalized for alcohol intoxication or withdrawal.  He denies having experienced any withdrawal seizures or hallucinations, although does have morning tremors that resolve with alcohol consumption.  He denies a history of hemorrhoids or any abdominal disorders/diseases.  He denies any recent sick contacts and received his first covid-19 vaccination (Moderna) earlier this month.  He does occasionally take advil for headaches and back pain, although no more than 2 soft gels for a maximum of 4 days straight.  Also, patient has been on propanolol for anxiety.    In the ED:  Initial vital signs: T: 98.9 F, HR: 92, BP: 120/56, R: 22, SpO2: 94% on RA  ED course:   Labs: significant for WBC 17.35, Hgb 11.9, Plt 141, PT/INR 22.4/1.93, PTT 39.3, Ammonia 65, Na 122, T bili 11.0, Alk Phos 158, AST//101, Lactate 6.3, Lipase 418, , Troponin I 0.017, JHON 228, FOBT +  Imaging:   CT Chest/Abd: Hepatic steatosis. Mild splenomegaly. Abdominal varicose veins. Large ascites. Mesenteric edema. Extensive subcutaneous edema in the anterior and chest wall.  EKG: Normal Sinus Rhythm   Medications: Ceftriaxone 2g IV   Consults: none  (12 Apr 2021 12:25)    GI consulted for c/f alc hep. Patient seen and examined at bedside.     Allergies    No Known Allergies    Intolerances      Home Medications:  propranolol 20 mg oral tablet: 1 tab(s) orally 2 times a day (12 Apr 2021 14:41)  Zoloft 100 mg oral tablet: 1 tab(s) orally once a day (12 Apr 2021 14:41)    MEDICATIONS:  MEDICATIONS  (STANDING):  albumin human 25% IVPB 100 milliLiter(s) IV Intermittent once  chlorhexidine 4% Liquid 1 Application(s) Topical <User Schedule>  folic acid 1 milliGRAM(s) Oral daily  melatonin 5 milliGRAM(s) Oral at bedtime  multivitamin 1 Tablet(s) Oral daily  pantoprazole    Tablet 40 milliGRAM(s) Oral before breakfast  sertraline 100 milliGRAM(s) Oral daily  thiamine IVPB 500 milliGRAM(s) IV Intermittent every 8 hours    MEDICATIONS  (PRN):  LORazepam   Injectable 1 milliGRAM(s) IV Push every 2 hours PRN CIWA-Ar score increase by 2 points and a total score of 7 or less    PAST MEDICAL & SURGICAL HISTORY:  Anxiety    Depression    Lumbar disc herniation    HLD (hyperlipidemia)    MICHELLE on CPAP    Alcohol abuse      FAMILY HISTORY:  FH: CAD (coronary artery disease)    FH: lung cancer (Father)    Family history of liver transplant (Father)  X2 liver transplants, 2/2 alcohol abuse    FH: depression      SOCIAL HISTORY:  Tobacco: [ ] Current, [ ] Former, [ ] Never; Pack Years:  Alcohol:  Illicit Drugs:    REVIEW OF SYSTEMS:  CONSTITUTIONAL: No weakness, fevers or chills  HEENT: No visual changes; No vertigo or throat pain   NECK: No pain or stiffness  RESPIRATORY: No cough, wheezing, hemoptysis; No shortness of breath  CARDIOVASCULAR: No chest pain or palpitations  GASTROINTESTINAL: As above.  GENITOURINARY: No dysuria, frequency or hematuria  NEUROLOGICAL: No numbness or weakness  SKIN: No itching, burning, rashes, or lesions   All other 10 review of systems is negative unless indicated above.    Vital Signs Last 24 Hrs  T(C): 36.8 (12 Apr 2021 14:06), Max: 37.2 (12 Apr 2021 03:58)  T(F): 98.3 (12 Apr 2021 14:06), Max: 98.9 (12 Apr 2021 03:58)  HR: 95 (12 Apr 2021 15:00) (88 - 117)  BP: 145/67 (12 Apr 2021 15:00) (112/68 - 154/84)  BP(mean): 96 (12 Apr 2021 15:00) (95 - 108)  RR: 12 (12 Apr 2021 15:00) (12 - 27)  SpO2: 95% (12 Apr 2021 15:00) (89% - 96%)      PHYSICAL EXAM:    General: Well developed; well nourished; in no acute distress  Eyes: Anicteric sclerae, moist conjunctivae  HENT: Moist mucous membranes  Neck: Trachea midline, supple  Lungs: Normal respiratory effort, no intercostal retractions  Cardiovascular: RRR  Abdomen: Soft, non-tender significantly distended; Normal bowel sounds; No rebound or guarding  Extremities: Normal range of motion, No clubbing, cyanosis or edema  Neurological: Alert and oriented x3  Skin: Warm and dry. No obvious rash    LABS:                        11.2   16.52 )-----------( 131      ( 12 Apr 2021 11:33 )             31.3     04-12    121<L>  |  87<L>  |  11  ----------------------------<  110<H>  4.4   |  21<L>  |  0.67    Ca    8.2<L>      12 Apr 2021 11:33    TPro  5.9<L>  /  Alb  2.6<L>  /  TBili  10.8<H>  /  DBili  6.9<H>  /  AST  228<H>  /  ALT  82<H>  /  AlkPhos  152<H>  04-12        PT/INR - ( 12 Apr 2021 11:33 )   PT: 23.5 sec;   INR: 2.03          PTT - ( 12 Apr 2021 11:33 )  PTT:39.2 sec    RADIOLOGY & ADDITIONAL STUDIES:     Reviewed
44yo M PMHx alcohol abuse, anxiety, depression, HLD, MICHELLE (on CPAP), and herniated disks presents for bloody stool X1 month and distended abdomen likely 2/2 alcoholic hepatitis now s/p diagnostic and therapeutic paracentesis. S/p 4.7L bedside paracentesis, per team, remaining large volume ascites not able to be drained due to limited collection canisters.  IR consulted for paracentesis.       Clinical History: BLOOD IN STOOL    FH: CAD (coronary artery disease)    FH: lung cancer (Father)    Family history of liver transplant (Father)    FH: depression    Handoff    MEWS Score    No pertinent past medical history    Anxiety    Depression    Lumbar disc herniation    HLD (hyperlipidemia)    MICHELLE on CPAP    Alcohol abuse    Ascites due to alcoholic hepatitis    Ascites due to alcoholic hepatitis    Rectal bleeding    Alcohol abuse    Lactic acidosis    Hyponatremia    Leukocytosis    MICHELLE on CPAP    Lumbar disc herniation    Nutrition, metabolism, and development symptoms    Anxiety    BLOOD IN STOOL    Cirrhosis    SysAdmin_VisitLink        Meds:albumin human 25% IVPB 100 milliLiter(s) IV Intermittent once  albumin human 25% IVPB 100 milliLiter(s) IV Intermittent once  chlorhexidine 4% Liquid 1 Application(s) Topical <User Schedule>  folic acid 1 milliGRAM(s) Oral daily  insulin lispro (ADMELOG) corrective regimen sliding scale   SubCutaneous Before meals and at bedtime  LORazepam   Injectable 1 milliGRAM(s) IV Push every 2 hours PRN  multivitamin 1 Tablet(s) Oral daily  pantoprazole    Tablet 40 milliGRAM(s) Oral before breakfast  phytonadione  IVPB 10 milliGRAM(s) IV Intermittent every 24 hours  prednisoLONE Syrup 3 mG/mL (PRELONE) 40 milliGRAM(s) Oral every 24 hours  propranolol 20 milliGRAM(s) Oral every 12 hours  sertraline 100 milliGRAM(s) Oral daily  thiamine 100 milliGRAM(s) Oral every 24 hours      Allergies:No Known Allergies        Labs:                           10.4   10.42 )-----------( 74       ( 13 Apr 2021 08:28 )             28.8     PT/INR - ( 13 Apr 2021 08:28 )   PT: 22.3 sec;   INR: 1.92          PTT - ( 13 Apr 2021 08:28 )  PTT:38.6 sec  04-13    124<L>  |  89<L>  |  8   ----------------------------<  101<H>  3.8   |  25  |  0.58    Ca    7.9<L>      13 Apr 2021 08:28  Phos  2.6     04-13  Mg     2.0     04-13    TPro  5.5<L>  /  Alb  3.1<L>  /  TBili  13.1<H>  /  DBili  x   /  AST  200<H>  /  ALT  74<H>  /  AlkPhos  158<H>  04-13          Imaging Findings: large volume ascites

## 2021-04-16 NOTE — PROGRESS NOTE ADULT - PROBLEM SELECTOR PLAN 1
Patient with excessive alcohol use and increasing quantities since pandemic began.  Supporting clinical factors include jaundice and scleral icterus upon exam, initial AST//101, prolonged INR, elevated T bili (11.0), and leukocytosis.  JHON elevated.  CT abd with identification of large volume abdominal ascites.  - GI consulted, appreciate recs. Not starting lactulose/rifamixin due to no hepatic encephalopathy. Will reassess diuretic need. Started on prednisolone.   - s/p therapeutic paracentesis 4/12 and 4/13. Total of 13.8L drained. Continue monitoring.   - MVI, thiamine (now 100mg), folic acid  - vitamin K challenge 10 mg IV daily x3 days (4/12-4/15)- failed  - f/u Acute Hepatitis Panel, antinuclear antibody, antismooth muscle antibody, anti-liver/kidney microsomal antibody type 1, immunoglobulin level, EBV serologies, CMV IgM, HSV IgM, Ceruloplasmin Level  - Check CBC, Coags, CMP daily  - C/w Lasix 40mg BID and spironolactone 200 QD with increased UOP  - Plan for transjugular bx of liver with wedge pressure today Patient with excessive alcohol use and increasing quantities since pandemic began.  Supporting clinical factors include jaundice and scleral icterus upon exam, initial AST//101, prolonged INR, elevated T bili (11.0), and leukocytosis.  JHON elevated.  CT abd with identification of large volume abdominal ascites.  - GI consulted, appreciate recs. Not starting lactulose/rifamixin due to no hepatic encephalopathy. Will reassess diuretic need. Started on prednisolone.   - s/p therapeutic paracentesis 4/12 and 4/13. Total of 13.8L drained. Continue monitoring.   - MVI, thiamine, folic acid  - vitamin K challenge 10 mg IV daily x3 days (4/12-4/15)  - f/u Acute Hepatitis Panel, antinuclear antibody, antismooth muscle antibody, anti-liver/kidney microsomal antibody type 1, immunoglobulin level, EBV serologies, CMV IgM, HSV IgM, Ceruloplasmin Level  - Check CBC, Coags, CMP daily  - C/w Lasix 40mg BID and spironolactone 200 QD with increased UOP  - Plan for transjugular bx of liver with wedge pressure today

## 2021-04-16 NOTE — DISCHARGE NOTE PROVIDER - DETAILS OF MALNUTRITION DIAGNOSIS/DIAGNOSES
This patient has been assessed with a concern for Malnutrition and was treated during this hospitalization for the following Nutrition diagnosis/diagnoses:     -  04/15/2021: Morbid obesity (BMI > 40)

## 2021-04-17 LAB
CULTURE RESULTS: NO GROWTH — SIGNIFICANT CHANGE UP
SPECIMEN SOURCE: SIGNIFICANT CHANGE UP

## 2021-04-18 LAB
CULTURE RESULTS: SIGNIFICANT CHANGE UP
SPECIMEN SOURCE: SIGNIFICANT CHANGE UP

## 2021-04-19 LAB
CULTURE RESULTS: NO GROWTH — SIGNIFICANT CHANGE UP
SPECIMEN SOURCE: SIGNIFICANT CHANGE UP

## 2021-04-20 PROBLEM — E78.5 HYPERLIPIDEMIA, UNSPECIFIED: Chronic | Status: ACTIVE | Noted: 2021-04-12

## 2021-04-20 PROBLEM — F41.9 ANXIETY DISORDER, UNSPECIFIED: Chronic | Status: ACTIVE | Noted: 2021-04-12

## 2021-04-20 PROBLEM — F32.9 MAJOR DEPRESSIVE DISORDER, SINGLE EPISODE, UNSPECIFIED: Chronic | Status: ACTIVE | Noted: 2021-04-12

## 2021-04-20 PROBLEM — F10.10 ALCOHOL ABUSE, UNCOMPLICATED: Chronic | Status: ACTIVE | Noted: 2021-04-12

## 2021-04-20 PROBLEM — G47.33 OBSTRUCTIVE SLEEP APNEA (ADULT) (PEDIATRIC): Chronic | Status: ACTIVE | Noted: 2021-04-12

## 2021-04-20 PROBLEM — M51.26 OTHER INTERVERTEBRAL DISC DISPLACEMENT, LUMBAR REGION: Chronic | Status: ACTIVE | Noted: 2021-04-12

## 2021-04-21 PROBLEM — Z00.00 ENCOUNTER FOR PREVENTIVE HEALTH EXAMINATION: Status: ACTIVE | Noted: 2021-04-21

## 2021-04-22 DIAGNOSIS — F10.10 ALCOHOL ABUSE, UNCOMPLICATED: ICD-10-CM

## 2021-04-22 DIAGNOSIS — G47.33 OBSTRUCTIVE SLEEP APNEA (ADULT) (PEDIATRIC): ICD-10-CM

## 2021-04-22 DIAGNOSIS — E66.01 MORBID (SEVERE) OBESITY DUE TO EXCESS CALORIES: ICD-10-CM

## 2021-04-22 DIAGNOSIS — E78.5 HYPERLIPIDEMIA, UNSPECIFIED: ICD-10-CM

## 2021-04-22 DIAGNOSIS — D69.6 THROMBOCYTOPENIA, UNSPECIFIED: ICD-10-CM

## 2021-04-22 DIAGNOSIS — M51.26 OTHER INTERVERTEBRAL DISC DISPLACEMENT, LUMBAR REGION: ICD-10-CM

## 2021-04-22 DIAGNOSIS — F41.9 ANXIETY DISORDER, UNSPECIFIED: ICD-10-CM

## 2021-04-22 DIAGNOSIS — K76.6 PORTAL HYPERTENSION: ICD-10-CM

## 2021-04-22 DIAGNOSIS — E87.1 HYPO-OSMOLALITY AND HYPONATREMIA: ICD-10-CM

## 2021-04-22 DIAGNOSIS — E87.2 ACIDOSIS: ICD-10-CM

## 2021-04-22 DIAGNOSIS — D64.9 ANEMIA, UNSPECIFIED: ICD-10-CM

## 2021-04-22 DIAGNOSIS — K70.11 ALCOHOLIC HEPATITIS WITH ASCITES: ICD-10-CM

## 2021-04-22 DIAGNOSIS — R10.9 UNSPECIFIED ABDOMINAL PAIN: ICD-10-CM

## 2021-04-22 DIAGNOSIS — D72.829 ELEVATED WHITE BLOOD CELL COUNT, UNSPECIFIED: ICD-10-CM

## 2021-04-22 DIAGNOSIS — D68.9 COAGULATION DEFECT, UNSPECIFIED: ICD-10-CM

## 2021-04-22 DIAGNOSIS — F32.9 MAJOR DEPRESSIVE DISORDER, SINGLE EPISODE, UNSPECIFIED: ICD-10-CM

## 2021-04-22 DIAGNOSIS — K70.31 ALCOHOLIC CIRRHOSIS OF LIVER WITH ASCITES: ICD-10-CM

## 2021-04-22 DIAGNOSIS — K62.5 HEMORRHAGE OF ANUS AND RECTUM: ICD-10-CM

## 2021-04-22 DIAGNOSIS — B19.9 UNSPECIFIED VIRAL HEPATITIS WITHOUT HEPATIC COMA: ICD-10-CM

## 2021-04-30 NOTE — PROGRESS NOTE ADULT - PROBLEM/PLAN-8
Rx doxycycline, final C&S pending
DISPLAY PLAN FREE TEXT

## 2021-06-08 ENCOUNTER — OFFICE VISIT (OUTPATIENT)
Dept: URBAN - METROPOLITAN AREA CLINIC 107 | Facility: CLINIC | Age: 46
End: 2021-06-08
Payer: COMMERCIAL

## 2021-06-08 ENCOUNTER — WEB ENCOUNTER (OUTPATIENT)
Dept: URBAN - METROPOLITAN AREA CLINIC 107 | Facility: CLINIC | Age: 46
End: 2021-06-08

## 2021-06-08 VITALS
RESPIRATION RATE: 18 BRPM | WEIGHT: 260 LBS | HEART RATE: 94 BPM | SYSTOLIC BLOOD PRESSURE: 135 MMHG | DIASTOLIC BLOOD PRESSURE: 81 MMHG | HEIGHT: 70 IN | BODY MASS INDEX: 37.22 KG/M2 | TEMPERATURE: 98.3 F

## 2021-06-08 DIAGNOSIS — K70.9 ALCOHOLIC LIVER DISEASE: ICD-10-CM

## 2021-06-08 DIAGNOSIS — K70.11 ASCITES DUE TO ALCOHOLIC HEPATITIS: ICD-10-CM

## 2021-06-08 DIAGNOSIS — K62.5 BRIGHT RED BLOOD PER RECTUM: ICD-10-CM

## 2021-06-08 DIAGNOSIS — R19.5 FECAL OCCULT BLOOD TEST POSITIVE: ICD-10-CM

## 2021-06-08 DIAGNOSIS — R19.7 DIARRHEA, UNSPECIFIED TYPE: ICD-10-CM

## 2021-06-08 PROBLEM — 59614000: Status: ACTIVE | Noted: 2021-06-08

## 2021-06-08 PROCEDURE — 99204 OFFICE O/P NEW MOD 45 MIN: CPT | Performed by: INTERNAL MEDICINE

## 2021-06-08 RX ORDER — PROPRANOLOL HYDROCHLORIDE 20 MG/1
1 TABLET TABLET ORAL ONCE A DAY
Status: ACTIVE | COMMUNITY

## 2021-06-08 RX ORDER — LACTULOSE 10 G/15ML
15 ML SOLUTION ORAL ONCE A DAY
Status: ACTIVE | COMMUNITY

## 2021-06-08 RX ORDER — SPIRONOLACTONE 100 MG/1
1 TABLET TABLET, FILM COATED ORAL ONCE A DAY
Qty: 30 | Refills: 3 | OUTPATIENT

## 2021-06-08 RX ORDER — OMEPRAZOLE 20 MG/1
1 TABLET 30 MINUTES BEFORE MORNING MEAL TABLET, DELAYED RELEASE ORAL ONCE A DAY
Status: ACTIVE | COMMUNITY

## 2021-06-08 RX ORDER — FUROSEMIDE 40 MG/1
1 TABLET TABLET ORAL ONCE A DAY
Qty: 30 | Refills: 3 | OUTPATIENT

## 2021-06-08 RX ORDER — SERTRALINE HYDROCHLORIDE 100 MG/1
1 TABLET TABLET, FILM COATED ORAL ONCE A DAY
Status: ACTIVE | COMMUNITY

## 2021-06-08 NOTE — HPI-OTHER HISTORIES
Labs 6/3/2021:CBC: WBC 13, hemoglobin 12.7, MCV 91.1, platelet 161.  BMP normal with exception of sodium 134, calcium 8.3, albumin 2.8 and notable for creatinine 0.60.  LFTs: TB 6.3, , ALT 60, .  PT 20.2, INR 1.79.  Lipase 447.  Ammonia 54.63.  PTT 37.2.  Magnesium 2.  Phosphorus 2.83.  Fecal occult blood positive.   Calculated MELD 22.  Ultrasound-guided paracentesis 6/3/2021: 12 L of fluid was removed and sent to the lab for analysis.  Those results are not available.

## 2021-06-08 NOTE — PHYSICAL EXAM GASTROINTESTINAL
Abdomen , soft, nontender, distended , no guarding or rigidity , no masses palpable , normal bowel sounds , Liver and Spleen , no hepatosplenomegaly. Clear pale yellow fluid leaking from paracentesis site saturating a portion of his clothing. Pitting edema over abdomen.

## 2021-06-14 ENCOUNTER — APPOINTMENT (OUTPATIENT)
Age: 46
End: 2021-06-14

## 2021-07-01 ENCOUNTER — TELEPHONE ENCOUNTER (OUTPATIENT)
Dept: URBAN - METROPOLITAN AREA CLINIC 113 | Facility: CLINIC | Age: 46
End: 2021-07-01

## 2021-07-01 LAB
A/G RATIO: 0.9
ALBUMIN: 3.1
ALKALINE PHOSPHATASE: 124
ALT (SGPT): 49
AST (SGOT): 97
BASO (ABSOLUTE): 0.1
BASOS: 1
BILIRUBIN, TOTAL: 4.9
BUN/CREATININE RATIO: 11
BUN: 10
CALCIUM: 8.3
CARBON DIOXIDE, TOTAL: 21
CHLORIDE: 102
CREATININE: 0.88
EGFR IF AFRICN AM: 120
EGFR IF NONAFRICN AM: 104
EOS (ABSOLUTE): 0.1
EOS: 1
GLOBULIN, TOTAL: 3.3
GLUCOSE: 100
HEMATOCRIT: 34.7
HEMATOLOGY COMMENTS:: (no result)
HEMOGLOBIN: 12.2
IMMATURE CELLS: (no result)
IMMATURE GRANS (ABS): 0.1
IMMATURE GRANULOCYTES: 1
LYMPHS (ABSOLUTE): 1.7
LYMPHS: 12
MCH: 29.2
MCHC: 35.2
MCV: 83
MONOCYTES(ABSOLUTE): 1.2
MONOCYTES: 8
NEUTROPHILS (ABSOLUTE): 10.8
NEUTROPHILS: 77
NRBC: (no result)
PLATELETS: 104
POTASSIUM: 3.2
PROTEIN, TOTAL: 6.4
RBC: 4.18
RDW: 16.3
SODIUM: 136
WBC: 13.9

## 2021-07-01 RX ORDER — OMEPRAZOLE 20 MG/1
1 TABLET 30 MINUTES BEFORE MORNING MEAL TABLET, DELAYED RELEASE ORAL ONCE A DAY
Status: ACTIVE | COMMUNITY

## 2021-07-01 RX ORDER — SERTRALINE HYDROCHLORIDE 100 MG/1
1 TABLET TABLET, FILM COATED ORAL ONCE A DAY
Status: ACTIVE | COMMUNITY

## 2021-07-01 RX ORDER — LACTULOSE 10 G/15ML
15 ML SOLUTION ORAL ONCE A DAY
Status: ACTIVE | COMMUNITY

## 2021-07-01 RX ORDER — FUROSEMIDE 40 MG/1
1 TABLET TABLET ORAL ONCE A DAY
Qty: 30 | Refills: 3 | Status: ACTIVE | COMMUNITY

## 2021-07-01 RX ORDER — SPIRONOLACTONE 100 MG/1
1 TABLET TABLET, FILM COATED ORAL ONCE A DAY
Qty: 30 | Refills: 3 | Status: ACTIVE | COMMUNITY

## 2021-07-01 RX ORDER — POTASSIUM CHLORIDE 750 MG/1
2 TABLETS WITH FOOD TABLET, FILM COATED, EXTENDED RELEASE ORAL TWICE A DAY
Qty: 12 TABLET | Refills: 0 | OUTPATIENT
Start: 2021-07-01 | End: 2021-07-04

## 2021-07-01 RX ORDER — PROPRANOLOL HYDROCHLORIDE 20 MG/1
1 TABLET TABLET ORAL ONCE A DAY
Status: ACTIVE | COMMUNITY

## 2021-07-09 ENCOUNTER — TELEPHONE ENCOUNTER (OUTPATIENT)
Dept: URBAN - METROPOLITAN AREA CLINIC 113 | Facility: CLINIC | Age: 46
End: 2021-07-09

## 2021-07-09 LAB
BUN/CREATININE RATIO: 9
BUN: 9
CARBON DIOXIDE, TOTAL: 25
CHLORIDE: 101
CREATININE: 0.96
EGFR IF AFRICN AM: 110
EGFR IF NONAFRICN AM: 95
GLUCOSE: 73
POTASSIUM: 3.4
SODIUM: 139

## 2021-07-09 RX ORDER — POTASSIUM CHLORIDE 750 MG/1
2 TABLETS WITH FOOD TABLET, FILM COATED, EXTENDED RELEASE ORAL TWICE A DAY
Qty: 12 TABLET | Refills: 0
Start: 2021-07-01 | End: 2021-07-12

## 2021-07-15 ENCOUNTER — OFFICE VISIT (OUTPATIENT)
Dept: URBAN - METROPOLITAN AREA CLINIC 113 | Facility: CLINIC | Age: 46
End: 2021-07-15
Payer: COMMERCIAL

## 2021-07-15 VITALS
BODY MASS INDEX: 32.64 KG/M2 | TEMPERATURE: 98.2 F | RESPIRATION RATE: 18 BRPM | DIASTOLIC BLOOD PRESSURE: 85 MMHG | WEIGHT: 228 LBS | SYSTOLIC BLOOD PRESSURE: 142 MMHG | HEART RATE: 124 BPM | HEIGHT: 70 IN

## 2021-07-15 DIAGNOSIS — K70.31 ALCOHOLIC CIRRHOSIS OF LIVER WITH ASCITES: ICD-10-CM

## 2021-07-15 DIAGNOSIS — E87.6 HYPOKALEMIA: ICD-10-CM

## 2021-07-15 DIAGNOSIS — K62.5 RECTAL BLEEDING: ICD-10-CM

## 2021-07-15 DIAGNOSIS — R19.7 DIARRHEA, UNSPECIFIED TYPE: ICD-10-CM

## 2021-07-15 PROBLEM — 62315008: Status: ACTIVE | Noted: 2021-06-08

## 2021-07-15 PROCEDURE — 99213 OFFICE O/P EST LOW 20 MIN: CPT | Performed by: INTERNAL MEDICINE

## 2021-07-15 RX ORDER — SERTRALINE HYDROCHLORIDE 100 MG/1
1 TABLET TABLET, FILM COATED ORAL ONCE A DAY
Status: ACTIVE | COMMUNITY

## 2021-07-15 RX ORDER — LACTULOSE 10 G/15ML
15 ML SOLUTION ORAL ONCE A DAY
Status: ACTIVE | COMMUNITY

## 2021-07-15 RX ORDER — PROPRANOLOL HYDROCHLORIDE 20 MG/1
1 TABLET TABLET ORAL ONCE A DAY
Status: ACTIVE | COMMUNITY

## 2021-07-15 RX ORDER — FUROSEMIDE 40 MG/1
1 TABLET TABLET ORAL ONCE A DAY
Qty: 30 | Refills: 3 | Status: ACTIVE | COMMUNITY

## 2021-07-15 RX ORDER — OMEPRAZOLE 20 MG/1
1 TABLET 30 MINUTES BEFORE MORNING MEAL TABLET, DELAYED RELEASE ORAL ONCE A DAY
Status: ACTIVE | COMMUNITY

## 2021-07-15 RX ORDER — SPIRONOLACTONE 100 MG/1
1 TABLET TABLET, FILM COATED ORAL ONCE A DAY
Qty: 30 | Refills: 3 | Status: ACTIVE | COMMUNITY

## 2021-07-15 NOTE — HPI-OTHER HISTORIES
45 y/o male presenting for f/u. He is still on lasix 40/d aldactone 100/d. He is also on potassium supplement. He is due for labs on Friday to re-check his hypokalemia. He does feel well. He denies any gi bleeding or nausea/emesis.   He has not had his EGD or CSC yet. He denies any current GI issues. He has not had any ETOH since his hospitalization.   6/8/2021 Records from [facility]: Labs include: 4/16/2021:CBC: WBC 9.81, hemoglobin 10, MCV 95.4, platelet 80.  PT 23, INR 1.98 BMP normal and notable for sodium 137, creatinine 0.72.  LFTs: TB 10.6, , ALT 66, .   4/13/2021:Hemoglobin A1c 4.2.  Serum IgG 1494, IgA 548, IgM 122.  Ceruloplasmin 16.  GERHARD negative.  ASMA less than 1.20.  Hepatitis A antibody IgM, hepatitis B core IgM antibody, hepatitis B surface antigen, hepatitis C antibody nonreactive.  L KM 1 less than 20.0. 4/12/2021 occult blood stool positive. Liver biopsy: Transjugular liver biopsy 4/16/2021:Elevated hepatic vein pressure gradient noted.  Pathology: Cirrhosis with fibrosis and regenerative nodules.  Mild macrovesicular steatosis.  Increased iron deposition with in hepatocytes focally. Imaging: Abdominal ultrasound 4/13/2021:Markedly limited study due to ascites and bowel gas.  Nondiagnostic Doppler assessment of hepatic vasculature patency.  CT liver protocol with contrast would be helpful for further assessment.  Abdominal ascites, moderate in bilateral lower quadrants.  Hepatic steatosis. CT of the chest, abdomen, and pelvis 4/12/2021:Hepatic steatosis.  Mild splenomegaly.  Abdominal varicose veins.  Large ascites.  Mesenteric edema.  Extensive subcutaneous edema in the anterior chest wall. Labs 6/3/2021:CBC: WBC 13, hemoglobin 12.7, MCV 91.1, platelet 161.  BMP normal with exception of sodium 134, calcium 8.3, albumin 2.8 and notable for creatinine 0.60.  LFTs: TB 6.3, , ALT 60, .  PT 20.2, INR 1.79.  Lipase 447.  Ammonia 54.63.  PTT 37.2.  Magnesium 2.  Phosphorus 2.83.  Fecal occult blood positive.   Calculated MELD 22.  Ultrasound-guided paracentesis 6/3/2021: 12 L of fluid was removed and sent to the lab for analysis.  Those results are not available.

## 2021-07-15 NOTE — HPI-TODAY'S VISIT:
This is a 46-year-old male with a history of decompensated alcoholic liver disease presenting to establish care.  He recently moved back to [place] from [place].    He was previously living in [place].  He reports a longstanding history of daily alcohol use. Volume of alcohol increased over months while he was at home because of Covid quarantine.  He reports drinking a sixpack of alcoholic seltzers and 6 shots of alcohol most evenings with an increase in alcohol use on the weekends.  He developed abdominal swelling.  In early April, he lost his balance and was unable to get up from the floor.  He went to [facility] and was admitted.  He reports receiving IV fluids and vitamin K.  An infection was ruled out.  He was also prescribed steroids.  He had 2 paracenteses over 2 days during which 4 L and 11 L of fluid was removed.  He underwent a transjugular liver biopsy and was discharged before the hospital before he received results. He was on diuretics for 3 weeks after he was discharged and stopped them when he exhausted his supply.   Because of recurrent jaundice and swelling, he went to the emergency department where he had a paracentesis on 6/3.  He is experiencing leaking at the site of the paracentesis.  He denies abdominal pain.  He has occasional cramps associated with bowel movements.  At baseline, he has frequent stools and reports as many as 8 bowel movements per day.  He reports intermittent red blood per rectum on the tissue and occasionally with the bowel movement but "separate from the stool."  He has noticed occasional small clots.  He has occasional indigestion.  He otherwise denies abdominal symptoms.  He was prescribed omeprazole 20 mg daily in the emergency department and he is taking this intermittently.  He is taking lactulose once a day prescribed in the emergency department. He denies a history of changes in sleep pattern or confustion. He has been on propanolol chronically for hypertension.  He states he has not had alcohol since early April when he was admitted to the hospital in [place].  His father had alcohol-related liver disease and required a liver transplant.  He denies a family history of colon cancer.  He has not had a prior colonoscopy.

## 2021-07-16 ENCOUNTER — TELEPHONE ENCOUNTER (OUTPATIENT)
Dept: URBAN - METROPOLITAN AREA CLINIC 113 | Facility: CLINIC | Age: 46
End: 2021-07-16

## 2021-07-16 PROBLEM — 74474003: Status: ACTIVE | Noted: 2021-06-08

## 2021-07-17 LAB
A/G RATIO: 0.9
AFP, SERUM, TUMOR MARKER: 5.2
ALBUMIN: 3.2
ALKALINE PHOSPHATASE: 126
ALT (SGPT): 43
AST (SGOT): 78
BILIRUBIN, TOTAL: 4.6
BUN/CREATININE RATIO: 12
BUN: 10
CALCIUM: 8.5
CARBON DIOXIDE, TOTAL: 23
CHLORIDE: 103
CREATININE: 0.83
EGFR IF AFRICN AM: 123
EGFR IF NONAFRICN AM: 106
GLOBULIN, TOTAL: 3.4
GLUCOSE: 70
HEMATOCRIT: 32.2
HEMATOLOGY COMMENTS:: (no result)
HEMOGLOBIN: 11.3
INR: 1.4
MCH: 28.7
MCHC: 35.1
MCV: 82
NRBC: (no result)
PLATELETS: 85
POTASSIUM: 3.4
PROTEIN, TOTAL: 6.6
PROTHROMBIN TIME: 14.6
RBC: 3.94
RDW: 16.4
SODIUM: 140
WBC: 8.1

## 2021-07-18 LAB
C DIFFICILE TOXINS A+B, EIA: NEGATIVE
CAMPYLOBACTER CULTURE: (no result)
E COLI SHIGA TOXIN EIA: NEGATIVE
Lab: (no result)
Lab: (no result)
OVA + PARASITE EXAM: (no result)
SALMONELLA/SHIGELLA SCREEN: (no result)
WHITE BLOOD CELLS (WBC), STOOL: (no result)

## 2021-07-21 ENCOUNTER — OFFICE VISIT (OUTPATIENT)
Dept: URBAN - METROPOLITAN AREA MEDICAL CENTER 19 | Facility: MEDICAL CENTER | Age: 46
End: 2021-07-21
Payer: COMMERCIAL

## 2021-07-21 DIAGNOSIS — K31.89 DUODENAL NODULE: ICD-10-CM

## 2021-07-21 DIAGNOSIS — I85.10 ESOPH VARICE OTHER DIS: ICD-10-CM

## 2021-07-21 DIAGNOSIS — K74.69 CRYPTOGENIC CIRRHOSIS: ICD-10-CM

## 2021-07-21 DIAGNOSIS — K76.6 PORTAL HYPERTENSION: ICD-10-CM

## 2021-07-21 PROCEDURE — 43239 EGD BIOPSY SINGLE/MULTIPLE: CPT | Performed by: INTERNAL MEDICINE

## 2021-07-21 RX ORDER — OMEPRAZOLE 20 MG/1
1 TABLET 30 MINUTES BEFORE MORNING MEAL TABLET, DELAYED RELEASE ORAL ONCE A DAY
Status: ACTIVE | COMMUNITY

## 2021-07-21 RX ORDER — PROPRANOLOL HYDROCHLORIDE 20 MG/1
1 TABLET TABLET ORAL ONCE A DAY
Status: ACTIVE | COMMUNITY

## 2021-07-21 RX ORDER — FUROSEMIDE 40 MG/1
1 TABLET TABLET ORAL ONCE A DAY
Qty: 30 | Refills: 3 | Status: ACTIVE | COMMUNITY

## 2021-07-21 RX ORDER — SERTRALINE HYDROCHLORIDE 100 MG/1
1 TABLET TABLET, FILM COATED ORAL ONCE A DAY
Status: ACTIVE | COMMUNITY

## 2021-07-21 RX ORDER — LACTULOSE 10 G/15ML
15 ML SOLUTION ORAL ONCE A DAY
Status: ACTIVE | COMMUNITY

## 2021-07-21 RX ORDER — SPIRONOLACTONE 100 MG/1
1 TABLET TABLET, FILM COATED ORAL ONCE A DAY
Qty: 30 | Refills: 3 | Status: ACTIVE | COMMUNITY

## 2021-08-05 ENCOUNTER — TELEPHONE ENCOUNTER (OUTPATIENT)
Dept: URBAN - METROPOLITAN AREA CLINIC 113 | Facility: CLINIC | Age: 46
End: 2021-08-05

## 2021-08-05 RX ORDER — FUROSEMIDE 40 MG/1
1 TABLET TABLET ORAL ONCE A DAY
Qty: 30 | Refills: 3 | Status: ACTIVE | COMMUNITY

## 2021-08-05 RX ORDER — SODIUM, POTASSIUM,MAG SULFATES 17.5-3.13G
354 ML SOLUTION, RECONSTITUTED, ORAL ORAL ONCE
Qty: 354 ML | Refills: 0 | OUTPATIENT

## 2021-08-05 RX ORDER — OMEPRAZOLE 20 MG/1
1 TABLET 30 MINUTES BEFORE MORNING MEAL TABLET, DELAYED RELEASE ORAL ONCE A DAY
Status: ACTIVE | COMMUNITY

## 2021-08-05 RX ORDER — PROPRANOLOL HYDROCHLORIDE 20 MG/1
1 TABLET TABLET ORAL ONCE A DAY
Status: ACTIVE | COMMUNITY

## 2021-08-05 RX ORDER — LACTULOSE 10 G/15ML
15 ML SOLUTION ORAL ONCE A DAY
Status: ACTIVE | COMMUNITY

## 2021-08-05 RX ORDER — SPIRONOLACTONE 100 MG/1
1 TABLET TABLET, FILM COATED ORAL ONCE A DAY
Qty: 30 | Refills: 3 | Status: ACTIVE | COMMUNITY

## 2021-08-05 RX ORDER — SERTRALINE HYDROCHLORIDE 100 MG/1
1 TABLET TABLET, FILM COATED ORAL ONCE A DAY
Status: ACTIVE | COMMUNITY

## 2021-08-06 ENCOUNTER — OFFICE VISIT (OUTPATIENT)
Dept: URBAN - METROPOLITAN AREA SURGERY CENTER 25 | Facility: SURGERY CENTER | Age: 46
End: 2021-08-06
Payer: COMMERCIAL

## 2021-08-06 DIAGNOSIS — K62.5 ANAL BLEEDING: ICD-10-CM

## 2021-08-06 DIAGNOSIS — K64.2 GRADE III INTERNAL HEMORRHOIDS: ICD-10-CM

## 2021-08-06 PROCEDURE — G8907 PT DOC NO EVENTS ON DISCHARG: HCPCS | Performed by: INTERNAL MEDICINE

## 2021-08-06 PROCEDURE — 45378 DIAGNOSTIC COLONOSCOPY: CPT | Performed by: INTERNAL MEDICINE

## 2021-08-06 RX ORDER — SODIUM, POTASSIUM,MAG SULFATES 17.5-3.13G
354 ML SOLUTION, RECONSTITUTED, ORAL ORAL ONCE
Qty: 354 ML | Refills: 0 | Status: ACTIVE | COMMUNITY

## 2021-08-06 RX ORDER — LACTULOSE 10 G/15ML
15 ML SOLUTION ORAL ONCE A DAY
Status: ACTIVE | COMMUNITY

## 2021-08-06 RX ORDER — SPIRONOLACTONE 100 MG/1
1 TABLET TABLET, FILM COATED ORAL ONCE A DAY
Qty: 30 | Refills: 3 | Status: ACTIVE | COMMUNITY

## 2021-08-06 RX ORDER — FUROSEMIDE 40 MG/1
1 TABLET TABLET ORAL ONCE A DAY
Qty: 30 | Refills: 3 | Status: ACTIVE | COMMUNITY

## 2021-08-06 RX ORDER — PROPRANOLOL HYDROCHLORIDE 20 MG/1
1 TABLET TABLET ORAL ONCE A DAY
Status: ACTIVE | COMMUNITY

## 2021-08-06 RX ORDER — OMEPRAZOLE 20 MG/1
1 TABLET 30 MINUTES BEFORE MORNING MEAL TABLET, DELAYED RELEASE ORAL ONCE A DAY
Status: ACTIVE | COMMUNITY

## 2021-08-06 RX ORDER — SERTRALINE HYDROCHLORIDE 100 MG/1
1 TABLET TABLET, FILM COATED ORAL ONCE A DAY
Status: ACTIVE | COMMUNITY

## 2021-08-17 ENCOUNTER — OFFICE VISIT (OUTPATIENT)
Dept: URBAN - METROPOLITAN AREA CLINIC 107 | Facility: CLINIC | Age: 46
End: 2021-08-17
Payer: COMMERCIAL

## 2021-08-17 VITALS
RESPIRATION RATE: 18 BRPM | DIASTOLIC BLOOD PRESSURE: 61 MMHG | HEART RATE: 67 BPM | WEIGHT: 227 LBS | SYSTOLIC BLOOD PRESSURE: 125 MMHG | TEMPERATURE: 98.3 F | BODY MASS INDEX: 32.5 KG/M2 | HEIGHT: 70 IN

## 2021-08-17 DIAGNOSIS — E87.6 HYPOKALEMIA: ICD-10-CM

## 2021-08-17 DIAGNOSIS — I85.10 SECONDARY ESOPHAGEAL VARICES WITHOUT BLEEDING: ICD-10-CM

## 2021-08-17 DIAGNOSIS — K70.31 ALCOHOLIC CIRRHOSIS OF LIVER WITH ASCITES: ICD-10-CM

## 2021-08-17 PROCEDURE — 99214 OFFICE O/P EST MOD 30 MIN: CPT | Performed by: INTERNAL MEDICINE

## 2021-08-17 RX ORDER — POTASSIUM CHLORIDE 750 MG/1
1 TABLET WITH FOOD TABLET, FILM COATED, EXTENDED RELEASE ORAL DAILY
Qty: 30 | Refills: 0
Start: 2021-07-01 | End: 2021-09-16

## 2021-08-17 RX ORDER — OMEPRAZOLE 20 MG/1
1 TABLET 30 MINUTES BEFORE MORNING MEAL TABLET, DELAYED RELEASE ORAL ONCE A DAY
Status: ACTIVE | COMMUNITY

## 2021-08-17 RX ORDER — SERTRALINE HYDROCHLORIDE 100 MG/1
1 TABLET TABLET, FILM COATED ORAL ONCE A DAY
Status: ACTIVE | COMMUNITY

## 2021-08-17 RX ORDER — PROPRANOLOL HYDROCHLORIDE 20 MG/1
1 TABLET TABLET ORAL ONCE A DAY
Status: ACTIVE | COMMUNITY

## 2021-08-17 RX ORDER — LACTULOSE 10 G/15ML
15 ML SOLUTION ORAL ONCE A DAY
Status: ACTIVE | COMMUNITY

## 2021-08-17 RX ORDER — SPIRONOLACTONE 100 MG/1
1 TABLET TABLET, FILM COATED ORAL ONCE A DAY
Qty: 30 | Refills: 3 | Status: ACTIVE | COMMUNITY

## 2021-08-17 RX ORDER — SODIUM, POTASSIUM,MAG SULFATES 17.5-3.13G
354 ML SOLUTION, RECONSTITUTED, ORAL ORAL ONCE
Qty: 354 ML | Refills: 0 | Status: ON HOLD | COMMUNITY

## 2021-08-17 RX ORDER — FUROSEMIDE 40 MG/1
1 TABLET TABLET ORAL ONCE A DAY
Qty: 30 | Refills: 3 | Status: ACTIVE | COMMUNITY

## 2021-08-17 NOTE — HPI-TODAY'S VISIT:
This is a 46-year-old male with a history of decompensated alcoholic cirrhosis with ascites presenting for follow-up after an EGD for variceal surveillance and a colonoscopy to evaluate rectal bleeding. He was last seen 7/15/2021.  He was compliant with diuretics and was on a potassium supplement.  He was due for repeat labs to assess hypokalemia.  He had previously experienced intermittent red blood per rectum and had previously had a positive stool for occult blood.  He denied abdominal symptoms including bleeding at his last visit.  He was abstinent from alcohol since early April when he required hospitalization in [place].  Labs were ordered and he was scheduled for an EGD and colonoscopy.  He states he is doing well.  He is taking daily fiber and is having regular bowel movements.  Diarrhea has improved.  He reports improvement of abdominal and lower extremity swelling.  He denies abdominal pain or any other abdominal symptoms.  He denies confusion or alterations in sleep pattern.  He continues to be abstinent from alcohol.  His last beverage was prior to hospitalization in [place] in April.  He is currently out of potassium.  He is observing a low sodium diet and is compliant with diuretics.

## 2021-08-17 NOTE — HPI-OTHER HISTORIES
Colonoscopy 8/6/2021:BB PS 9, grade 3 nonbleeding internal hemorrhoids, sigmoid and descending diverticulosis, otherwise normal to the terminal ileum. EGD 7/21/2021:Grade 1 varices in the lower third of esophagus, gastritis status post biopsy, normal examined duodenum.  Pathology: Gastric antral biopsy demonstrated gastric mucosa with dilated vessels and reactive epithelial changes consistent with portal hypertensive gastropathy.  Negative H. pylori.  EGD recommended in 1 year for variceal surveillance. Labs 7/16/2021:AFP 5.2.  BMP normal with exception of potassium 3.4, calcium 8.5, albumin 3.2 and notable for Na 140  and Cr 0.83.  LFTs: TB 4.6, , ALT 43, AST 78.  CBC: WBC 8.1, hemoglobin 11.3, MCV 82, platelet 85.  PT 14.6, INR 1.4. Calculated MELD 16.

## 2021-08-17 NOTE — PHYSICAL EXAM GASTROINTESTINAL
Abdomen , soft, nontender, distended due to ascites (improved) , no guarding or rigidity , no masses palpable , normal bowel sounds , Liver and Spleen , no hepatosplenomegaly

## 2021-08-22 ENCOUNTER — TELEPHONE ENCOUNTER (OUTPATIENT)
Dept: URBAN - METROPOLITAN AREA CLINIC 113 | Facility: CLINIC | Age: 46
End: 2021-08-22

## 2021-10-11 ENCOUNTER — WEB ENCOUNTER (OUTPATIENT)
Dept: URBAN - METROPOLITAN AREA CLINIC 113 | Facility: CLINIC | Age: 46
End: 2021-10-11

## 2021-11-18 ENCOUNTER — OFFICE VISIT (OUTPATIENT)
Dept: URBAN - METROPOLITAN AREA CLINIC 113 | Facility: CLINIC | Age: 46
End: 2021-11-18
Payer: COMMERCIAL

## 2021-11-18 VITALS
HEART RATE: 65 BPM | TEMPERATURE: 98.4 F | HEIGHT: 70 IN | WEIGHT: 235 LBS | RESPIRATION RATE: 18 BRPM | DIASTOLIC BLOOD PRESSURE: 66 MMHG | BODY MASS INDEX: 33.64 KG/M2 | SYSTOLIC BLOOD PRESSURE: 110 MMHG

## 2021-11-18 DIAGNOSIS — E87.6 HYPOKALEMIA: ICD-10-CM

## 2021-11-18 DIAGNOSIS — I85.10 SECONDARY ESOPHAGEAL VARICES WITHOUT BLEEDING: ICD-10-CM

## 2021-11-18 DIAGNOSIS — K70.31 ALCOHOLIC CIRRHOSIS OF LIVER WITH ASCITES: ICD-10-CM

## 2021-11-18 PROCEDURE — 99214 OFFICE O/P EST MOD 30 MIN: CPT | Performed by: INTERNAL MEDICINE

## 2021-11-18 RX ORDER — FUROSEMIDE 40 MG/1
1 TABLET TABLET ORAL ONCE A DAY
Qty: 30 | Refills: 3 | Status: ACTIVE | COMMUNITY

## 2021-11-18 RX ORDER — LACTULOSE 10 G/15ML
15 ML SOLUTION ORAL ONCE A DAY
Status: DISCONTINUED | COMMUNITY

## 2021-11-18 RX ORDER — SPIRONOLACTONE 100 MG/1
1 TABLET TABLET, FILM COATED ORAL ONCE A DAY
Qty: 30 | Refills: 3 | Status: ACTIVE | COMMUNITY

## 2021-11-18 RX ORDER — OMEPRAZOLE 20 MG/1
1 TABLET 30 MINUTES BEFORE MORNING MEAL TABLET, DELAYED RELEASE ORAL ONCE A DAY
Status: DISCONTINUED | COMMUNITY

## 2021-11-18 RX ORDER — PROPRANOLOL HYDROCHLORIDE 20 MG/1
1 TABLET TABLET ORAL ONCE A DAY
Status: ACTIVE | COMMUNITY

## 2021-11-18 RX ORDER — SODIUM, POTASSIUM,MAG SULFATES 17.5-3.13G
354 ML SOLUTION, RECONSTITUTED, ORAL ORAL ONCE
Qty: 354 ML | Refills: 0 | Status: ON HOLD | COMMUNITY

## 2021-11-18 RX ORDER — SERTRALINE HYDROCHLORIDE 100 MG/1
1 TABLET TABLET, FILM COATED ORAL ONCE A DAY
Status: ACTIVE | COMMUNITY

## 2021-11-18 NOTE — HPI-TODAY'S VISIT:
46-year-old male presenting for follow-up. He was last seen in the clinici in August 2021. He has been evaluated at [facility] for consideration of liver transplant.  His current meld at that time was 6.  He does have portal hypertension and small esophageal varices as well as ascites.  He has been adhering to Lasix and Aldactone.  He is up-to-date for colon cancer screening as of 9/20/21.  His next scheduled colonoscopy would be in 10 years.  Patient is without any abdominal complaints. No dysphagia, heartburn, regurgitation, unintentional weight loss, nausea, vomiting, hematemesis or melena. Bowels are moving regularly without blood per rectum. No complaints of bloating. No jaundice, icterus.  8/17/21 This is a 46-year-old male with a history of decompensated alcoholic cirrhosis with ascites presenting for follow-up after an EGD for variceal surveillance and a colonoscopy to evaluate rectal bleeding. He was last seen 7/15/2021.  He was compliant with diuretics and was on a potassium supplement.  He was due for repeat labs to assess hypokalemia.  He had previously experienced intermittent red blood per rectum and had previously had a positive stool for occult blood.  He denied abdominal symptoms including bleeding at his last visit.  He was abstinent from alcohol since early April when he required hospitalization in [place].  Labs were ordered and he was scheduled for an EGD and colonoscopy.  He states he is doing well.  He is taking daily fiber and is having regular bowel movements.  Diarrhea has improved.  He reports improvement of abdominal and lower extremity swelling.  He denies abdominal pain or any other abdominal symptoms.  He denies confusion or alterations in sleep pattern.  He continues to be abstinent from alcohol.  His last beverage was prior to hospitalization in [place] in April.  He is currently out of potassium.  He is observing a low sodium diet and is compliant with diuretics. This is a 46-year-old male with a history of decompensated alcoholic liver disease presenting to establish care.  He recently moved back to [place] from [place].    He was previously living in [place].  He reports a longstanding history of daily alcohol use. Volume of alcohol increased over months while he was at home because of Covid quarantine.  He reports drinking a sixpack of alcoholic seltzers and 6 shots of alcohol most evenings with an increase in alcohol use on the weekends.  He developed abdominal swelling.  In early April, he lost his balance and was unable to get up from the floor.  He went to [facility] and was admitted.  He reports receiving IV fluids and vitamin K.  An infection was ruled out.  He was also prescribed steroids.  He had 2 paracenteses over 2 days during which 4 L and 11 L of fluid was removed.  He underwent a transjugular liver biopsy and was discharged before the hospital before he received results. He was on diuretics for 3 weeks after he was discharged and stopped them when he exhausted his supply.   Because of recurrent jaundice and swelling, he went to the emergency department where he had a paracentesis on 6/3.  He is experiencing leaking at the site of the paracentesis.  He denies abdominal pain.  He has occasional cramps associated with bowel movements.  At baseline, he has frequent stools and reports as many as 8 bowel movements per day.  He reports intermittent red blood per rectum on the tissue and occasionally with the bowel movement but "separate from the stool."  He has noticed occasional small clots.  He has occasional indigestion.  He otherwise denies abdominal symptoms.  He was prescribed omeprazole 20 mg daily in the emergency department and he is taking this intermittently.  He is taking lactulose once a day prescribed in the emergency department. He denies a history of changes in sleep pattern or confustion. He has been on propanolol chronically for hypertension.  He states he has not had alcohol since early April when he was admitted to the hospital in [place].  His father had alcohol-related liver disease and required a liver transplant.  He denies a family history of colon cancer.  He has not had a prior colonoscopy.

## 2021-11-18 NOTE — HPI-OTHER HISTORIES
Colonoscopy 8/6/2021:BB PS 9, grade 3 nonbleeding internal hemorrhoids, sigmoid and descending diverticulosis, otherwise normal to the terminal ileum. EGD 7/21/2021:Grade 1 varices in the lower third of esophagus, gastritis status post biopsy, normal examined duodenum.  Pathology: Gastric antral biopsy demonstrated gastric mucosa with dilated vessels and reactive epithelial changes consistent with portal hypertensive gastropathy.  Negative H. pylori.  EGD recommended in 1 year for variceal surveillance. Labs 7/16/2021:AFP 5.2.  BMP normal with exception of potassium 3.4, calcium 8.5, albumin 3.2 and notable for Na 140  and Cr 0.83.  LFTs: TB 4.6, , ALT 43, AST 78.  CBC: WBC 8.1, hemoglobin 11.3, MCV 82, platelet 85.  PT 14.6, INR 1.4. Calculated MELD 16. 47 y/o male presenting for f/u. He is still on lasix 40/d aldactone 100/d. He is also on potassium supplement. He is due for labs on Friday to re-check his hypokalemia. He does feel well. He denies any gi bleeding or nausea/emesis.   He has not had his EGD or CSC yet. He denies any current GI issues. He has not had any ETOH since his hospitalization.   6/8/2021 Records from [place]: Labs include: 4/16/2021:CBC: WBC 9.81, hemoglobin 10, MCV 95.4, platelet 80.  PT 23, INR 1.98 BMP normal and notable for sodium 137, creatinine 0.72.  LFTs: TB 10.6, , ALT 66, .   4/13/2021:Hemoglobin A1c 4.2.  Serum IgG 1494, IgA 548, IgM 122.  Ceruloplasmin 16.  GERHARD negative.  ASMA less than 1.20.  Hepatitis A antibody IgM, hepatitis B core IgM antibody, hepatitis B surface antigen, hepatitis C antibody nonreactive.  L KM 1 less than 20.0. 4/12/2021 occult blood stool positive. Liver biopsy: Transjugular liver biopsy 4/16/2021:Elevated hepatic vein pressure gradient noted.  Pathology: Cirrhosis with fibrosis and regenerative nodules.  Mild macrovesicular steatosis.  Increased iron deposition with in hepatocytes focally. Imaging: Abdominal ultrasound 4/13/2021:Markedly limited study due to ascites and bowel gas.  Nondiagnostic Doppler assessment of hepatic vasculature patency.  CT liver protocol with contrast would be helpful for further assessment.  Abdominal ascites, moderate in bilateral lower quadrants.  Hepatic steatosis. CT of the chest, abdomen, and pelvis 4/12/2021:Hepatic steatosis.  Mild splenomegaly.  Abdominal varicose veins.  Large ascites.  Mesenteric edema.  Extensive subcutaneous edema in the anterior chest wall. Labs 6/3/2021:CBC: WBC 13, hemoglobin 12.7, MCV 91.1, platelet 161.  BMP normal with exception of sodium 134, calcium 8.3, albumin 2.8 and notable for creatinine 0.60.  LFTs: TB 6.3, , ALT 60, .  PT 20.2, INR 1.79.  Lipase 447.  Ammonia 54.63.  PTT 37.2.  Magnesium 2.  Phosphorus 2.83.  Fecal occult blood positive.   Calculated MELD 22.  Ultrasound-guided paracentesis 6/3/2021: 12 L of fluid was removed and sent to the lab for analysis.  Those results are not available.

## 2021-11-19 LAB
A/G RATIO: 1.4
AFP, SERUM, TUMOR MARKER: 7.4
ALBUMIN: 4.2
ALKALINE PHOSPHATASE: 113
ALT (SGPT): 34
AMMONIA, PLASMA: 88
AST (SGOT): 60
BILIRUBIN, TOTAL: 3.1
BUN/CREATININE RATIO: 13
BUN: 9
CALCIUM: 8.8
CARBON DIOXIDE, TOTAL: 20
CHLORIDE: 105
CREATININE: 0.69
EGFR IF AFRICN AM: 133
EGFR IF NONAFRICN AM: 115
GLOBULIN, TOTAL: 2.9
GLUCOSE: 76
HEMATOCRIT: 35.6
HEMATOLOGY COMMENTS:: (no result)
HEMOGLOBIN A1C: 4.6
HEMOGLOBIN: 12.8
INR: 1.3
MCH: 28.5
MCHC: 36
MCV: 79
NRBC: (no result)
PLATELETS: 49
POTASSIUM: 3.6
PROTEIN, TOTAL: 7.1
PROTHROMBIN TIME: 13.3
RBC: 4.49
RDW: 15.8
SODIUM: 140
WBC: 6.8

## 2022-01-01 NOTE — ED ADULT NURSE NOTE - NS ED NURSE RECORD ANOTHER VITAL SIGN
Notified NP at 1715 PM regarding change in condition.      Spoke with: Taryn, NNP    Orders were obtained.    Comments: Notified NNP of patient sleepiness despite breast and bottle feeding attempts since 1530.  Last oral intake 1230 and IVF decreased at that time.  RN finally able to get patient to bottle 30mL.  Will still plan to stop IVF's at 1800 and will place NG if patient continues to be sleepy and unable to BF/bottle adequate volumes moving forward. Mother updated with plan.        
Yes

## 2022-07-19 NOTE — PROGRESS NOTE ADULT - ATTENDING SUPERVISION STATEMENT
Fellow
Render Post-Care Instructions In Note?: no
Post-Care Instructions: I reviewed with the patient in detail post-care instructions. Patient is to wear sunprotection, and avoid picking at any of the treated lesions. Pt may apply Vaseline to crusted or scabbing areas.
Medical Necessity Information: It is in your best interest to select a reason for this procedure from the list below. All of these items fulfill various CMS LCD requirements except the new and changing color options.
Medical Necessity Clause: This procedure was medically necessary because the lesions that were treated were:
Detail Level: Detailed
Consent: The patient's consent was obtained including but not limited to risks of crusting, scabbing, blistering, scarring, darker or lighter pigmentary change, recurrence, incomplete removal and infection.
Size Of Lesion In Cm (Optional): 0
Resident

## 2022-09-23 NOTE — ED PROVIDER NOTE - OBJECTIVE STATEMENT
No
Swollen abdomen/ascites with jaundice, icteric sclera, frequent alcohol use, blood in stools for approx a week.  Patient has WBC 17K and elevated INR and hemoccult positive with elevated lactic acid.  Case d/w Dr Goss of MICU at [place] and recommends holding IV fluids at this time since fluids would third space and to hold on diagnostic and therapeutic paracentesis until albumin and possibly FFP can be administered in the ICU setting but to give 2g ceftriaxone in case this is SBP and transport time to ICU might be prolonged. Patient aware and will be admitted to ICU at [facility].  Patient does not have a primary care doctor.

## 2022-10-05 ENCOUNTER — LAB OUTSIDE AN ENCOUNTER (OUTPATIENT)
Dept: URBAN - METROPOLITAN AREA CLINIC 113 | Facility: CLINIC | Age: 47
End: 2022-10-05

## 2022-10-05 ENCOUNTER — OFFICE VISIT (OUTPATIENT)
Dept: URBAN - METROPOLITAN AREA CLINIC 113 | Facility: CLINIC | Age: 47
End: 2022-10-05
Payer: COMMERCIAL

## 2022-10-05 VITALS
WEIGHT: 277 LBS | DIASTOLIC BLOOD PRESSURE: 71 MMHG | TEMPERATURE: 98.2 F | BODY MASS INDEX: 39.65 KG/M2 | HEART RATE: 70 BPM | SYSTOLIC BLOOD PRESSURE: 120 MMHG | HEIGHT: 70 IN | RESPIRATION RATE: 16 BRPM

## 2022-10-05 DIAGNOSIS — K70.31 ALCOHOLIC CIRRHOSIS OF LIVER WITH ASCITES: ICD-10-CM

## 2022-10-05 DIAGNOSIS — I85.10 SECONDARY ESOPHAGEAL VARICES WITHOUT BLEEDING: ICD-10-CM

## 2022-10-05 DIAGNOSIS — E87.6 HYPOKALEMIA: ICD-10-CM

## 2022-10-05 PROBLEM — 14223005: Status: ACTIVE | Noted: 2021-08-22

## 2022-10-05 PROBLEM — 43339004: Status: ACTIVE | Noted: 2021-07-01

## 2022-10-05 PROCEDURE — 99214 OFFICE O/P EST MOD 30 MIN: CPT | Performed by: INTERNAL MEDICINE

## 2022-10-05 RX ORDER — PROPRANOLOL HYDROCHLORIDE 20 MG/1
1 TABLET TABLET ORAL ONCE A DAY
Status: ACTIVE | COMMUNITY

## 2022-10-05 RX ORDER — SPIRONOLACTONE 100 MG/1
1 TABLET TABLET, FILM COATED ORAL ONCE A DAY
Qty: 30 | Refills: 3 | Status: ACTIVE | COMMUNITY

## 2022-10-05 RX ORDER — SERTRALINE HYDROCHLORIDE 100 MG/1
1 TABLET TABLET, FILM COATED ORAL ONCE A DAY
Status: ACTIVE | COMMUNITY

## 2022-10-05 RX ORDER — SODIUM, POTASSIUM,MAG SULFATES 17.5-3.13G
354 ML SOLUTION, RECONSTITUTED, ORAL ORAL ONCE
Qty: 354 ML | Refills: 0 | Status: ON HOLD | COMMUNITY

## 2022-10-05 RX ORDER — FUROSEMIDE 40 MG/1
1 TABLET TABLET ORAL ONCE A DAY
Qty: 30 | Refills: 3 | Status: ACTIVE | COMMUNITY

## 2022-10-05 NOTE — HPI-TODAY'S VISIT:
47-year-old male presented for follow-up.  He was last seen in November 2021. His most recent labs were performed in August 2022. He had a normal hemoglobin and his platelet count was 45. His  Sodium 139, creatinine 0.62, bilirubin 2.5, alkaline phosphatase 106, AST 54, ALT 32. He was seen in the emergency room on August 16, 2022 for being unresponsive and diaphoretic on an airplane.  His blood glucose was low.  He was treated with D50 and food. This has not occurred since then.  Patient is without any abdominal complaints. No dysphagia, heartburn, regurgitation, unintentional weight loss, nausea, vomiting, hematemesis or melena. Bowels are moving regularly without blood per rectum. No complaints of bloating. No jaundice, icterus.  11/18/21 46-year-old male presenting for follow-up. He was last seen in the clinici in August 2021. He has been evaluated at [facility] for consideration of liver transplant.  His current meld at that time was 6.  He does have portal hypertension and small esophageal varices as well as ascites.  He has been adhering to Lasix and Aldactone.  He is up-to-date for colon cancer screening as of 9/20/21.  His next scheduled colonoscopy would be in 10 years.  Patient is without any abdominal complaints. No dysphagia, heartburn, regurgitation, unintentional weight loss, nausea, vomiting, hematemesis or melena. Bowels are moving regularly without blood per rectum. No complaints of bloating. No jaundice, icterus.  8/17/21 This is a 46-year-old male with a history of decompensated alcoholic cirrhosis with ascites presenting for follow-up after an EGD for variceal surveillance and a colonoscopy to evaluate rectal bleeding. He was last seen 7/15/2021.  He was compliant with diuretics and was on a potassium supplement.  He was due for repeat labs to assess hypokalemia.  He had previously experienced intermittent red blood per rectum and had previously had a positive stool for occult blood.  He denied abdominal symptoms including bleeding at his last visit.  He was abstinent from alcohol since early April when he required hospitalization in [place].  Labs were ordered and he was scheduled for an EGD and colonoscopy.  He states he is doing well.  He is taking daily fiber and is having regular bowel movements.  Diarrhea has improved.  He reports improvement of abdominal and lower extremity swelling.  He denies abdominal pain or any other abdominal symptoms.  He denies confusion or alterations in sleep pattern.  He continues to be abstinent from alcohol.  His last beverage was prior to hospitalization in [place] in April.  He is currently out of potassium.  He is observing a low sodium diet and is compliant with diuretics. This is a 46-year-old male with a history of decompensated alcoholic liver disease presenting to establish care.  He recently moved back to [place] from [place].    He was previously living in [place].  He reports a longstanding history of daily alcohol use. Volume of alcohol increased over months while he was at home because of Covid quarantine.  He reports drinking a sixpack of alcoholic seltzers and 6 shots of alcohol most evenings with an increase in alcohol use on the weekends.  He developed abdominal swelling.  In early April, he lost his balance and was unable to get up from the floor.  He went to [facility] and was admitted.  He reports receiving IV fluids and vitamin K.  An infection was ruled out.  He was also prescribed steroids.  He had 2 paracenteses over 2 days during which 4 L and 11 L of fluid was removed.  He underwent a transjugular liver biopsy and was discharged before the hospital before he received results. He was on diuretics for 3 weeks after he was discharged and stopped them when he exhausted his supply.   Because of recurrent jaundice and swelling, he went to the emergency department where he had a paracentesis on 6/3.  He is experiencing leaking at the site of the paracentesis.  He denies abdominal pain.  He has occasional cramps associated with bowel movements.  At baseline, he has frequent stools and reports as many as 8 bowel movements per day.  He reports intermittent red blood per rectum on the tissue and occasionally with the bowel movement but "separate from the stool."  He has noticed occasional small clots.  He has occasional indigestion.  He otherwise denies abdominal symptoms.  He was prescribed omeprazole 20 mg daily in the emergency department and he is taking this intermittently.  He is taking lactulose once a day prescribed in the emergency department. He denies a history of changes in sleep pattern or confustion. He has been on propanolol chronically for hypertension.  He states he has not had alcohol since early April when he was admitted to the hospital in [place].  His father had alcohol-related liver disease and required a liver transplant.  He denies a family history of colon cancer.  He has not had a prior colonoscopy.

## 2022-10-05 NOTE — HPI-OTHER HISTORIES
Colonoscopy 8/6/2021:BB PS 9, grade 3 nonbleeding internal hemorrhoids, sigmoid and descending diverticulosis, otherwise normal to the terminal ileum. EGD 7/21/2021:Grade 1 varices in the lower third of esophagus, gastritis status post biopsy, normal examined duodenum.  Pathology: Gastric antral biopsy demonstrated gastric mucosa with dilated vessels and reactive epithelial changes consistent with portal hypertensive gastropathy.  Negative H. pylori.  EGD recommended in 1 year for variceal surveillance. Labs 7/16/2021:AFP 5.2.  BMP normal with exception of potassium 3.4, calcium 8.5, albumin 3.2 and notable for Na 140  and Cr 0.83.  LFTs: TB 4.6, , ALT 43, AST 78.  CBC: WBC 8.1, hemoglobin 11.3, MCV 82, platelet 85.  PT 14.6, INR 1.4. Calculated MELD 16. 45 y/o male presenting for f/u. He is still on lasix 40/d aldactone 100/d. He is also on potassium supplement. He is due for labs on Friday to re-check his hypokalemia. He does feel well. He denies any gi bleeding or nausea/emesis.   He has not had his EGD or CSC yet. He denies any current GI issues. He has not had any ETOH since his hospitalization.   6/8/2021 Records from [place]: Labs include: 4/16/2021:CBC: WBC 9.81, hemoglobin 10, MCV 95.4, platelet 80.  PT 23, INR 1.98 BMP normal and notable for sodium 137, creatinine 0.72.  LFTs: TB 10.6, , ALT 66, .   4/13/2021:Hemoglobin A1c 4.2.  Serum IgG 1494, IgA 548, IgM 122.  Ceruloplasmin 16.  GERHARD negative.  ASMA less than 1.20.  Hepatitis A antibody IgM, hepatitis B core IgM antibody, hepatitis B surface antigen, hepatitis C antibody nonreactive.  L KM 1 less than 20.0. 4/12/2021 occult blood stool positive. Liver biopsy: Transjugular liver biopsy 4/16/2021:Elevated hepatic vein pressure gradient noted.  Pathology: Cirrhosis with fibrosis and regenerative nodules.  Mild macrovesicular steatosis.  Increased iron deposition with in hepatocytes focally. Imaging: Abdominal ultrasound 4/13/2021:Markedly limited study due to ascites and bowel gas.  Nondiagnostic Doppler assessment of hepatic vasculature patency.  CT liver protocol with contrast would be helpful for further assessment.  Abdominal ascites, moderate in bilateral lower quadrants.  Hepatic steatosis. CT of the chest, abdomen, and pelvis 4/12/2021:Hepatic steatosis.  Mild splenomegaly.  Abdominal varicose veins.  Large ascites.  Mesenteric edema.  Extensive subcutaneous edema in the anterior chest wall. Labs 6/3/2021:CBC: WBC 13, hemoglobin 12.7, MCV 91.1, platelet 161.  BMP normal with exception of sodium 134, calcium 8.3, albumin 2.8 and notable for creatinine 0.60.  LFTs: TB 6.3, , ALT 60, .  PT 20.2, INR 1.79.  Lipase 447.  Ammonia 54.63.  PTT 37.2.  Magnesium 2.  Phosphorus 2.83.  Fecal occult blood positive.   Calculated MELD 22.  Ultrasound-guided paracentesis 6/3/2021: 12 L of fluid was removed and sent to the lab for analysis.  Those results are not available.

## 2022-10-06 PROBLEM — 420054005: Status: ACTIVE | Noted: 2021-07-01

## 2022-10-07 LAB
A/G RATIO: 1.4
ABSOLUTE BASOPHILS: 98
ABSOLUTE EOSINOPHILS: 98
ABSOLUTE LYMPHOCYTES: 1029
ABSOLUTE MONOCYTES: 441
ABSOLUTE NEUTROPHILS: 3234
AFP, SERUM, TUMOR MARKER: 7.1
ALBUMIN: 3.9
ALKALINE PHOSPHATASE: 107
ALT (SGPT): 27
AMMONIA (P): 103
AST (SGOT): 37
BASOPHILS: 2
BILIRUBIN, TOTAL: 1.8
BUN/CREATININE RATIO: (no result)
BUN: 11
CALCIUM: 9
CARBON DIOXIDE, TOTAL: 25
CHLORIDE: 110
COMMENT(S): (no result)
CREATININE: 0.7
EGFR: 115
EOSINOPHILS: 2
GLOBULIN, TOTAL: 2.7
GLUCOSE: 75
HEMATOCRIT: 40.4
HEMOGLOBIN: 13.9
INR: 1.2
LYMPHOCYTES: 21
MCH: 28.1
MCHC: 34.4
MCV: 81.6
MONOCYTES: 9
MPV: 10.8
NEUTROPHILS: 66
NOTE: (no result)
PLATELET COUNT: 54
POTASSIUM: 4.4
PROTEIN, TOTAL: 6.6
PT: 12.3
RDW: 15.5
RED BLOOD CELL COUNT: 4.95
SODIUM: 141
WHITE BLOOD CELL COUNT: 4.9

## 2022-11-07 ENCOUNTER — TELEPHONE ENCOUNTER (OUTPATIENT)
Dept: URBAN - METROPOLITAN AREA CLINIC 113 | Facility: CLINIC | Age: 47
End: 2022-11-07

## 2022-11-10 ENCOUNTER — OFFICE VISIT (OUTPATIENT)
Dept: URBAN - METROPOLITAN AREA MEDICAL CENTER 19 | Facility: MEDICAL CENTER | Age: 47
End: 2022-11-10

## 2022-11-14 NOTE — ED PROVIDER NOTE - DISPOSITION TYPE
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 Patient had been admitted at [facility] for the past 4 days receiving a new chemotherapy for her abdominal sarcoma. She had frequent episodes of vomiting in ER treated with Phenergan and Zyprexa. She has not for the past 2 days. She states she was driving home from an appointment to have her blood count checked when she started feeling lightheaded as if she was going to pass out. She then had 4 episodes of syncope in her friend had just do them off the road because she was passed out. She denies any chest pain, shortness of breath    There are no other complaints, changes, or physical findings at this time. PCP: Sole Stafford MD    No current facility-administered medications on file prior to encounter. Current Outpatient Medications on File Prior to Encounter   Medication Sig Dispense Refill    calcium carbonate (CALTRATE 600 PO) Take 1 Tablet by mouth daily. valACYclovir (VALTREX) 500 mg tablet TAKE 1 TABLET BY MOUTH TWO TIMES A DAY. 60 Tablet 3    apixaban (ELIQUIS) 5 mg tablet Take 5 mg by mouth two (2) times a day. furosemide (LASIX) 40 mg tablet TAKE 1 TABLET BY MOUTH 2 TIMES DAILY. MAY DECREASE TO 1 TABLET DAILY AS NEEDED. albuterol (PROVENTIL HFA, VENTOLIN HFA, PROAIR HFA) 90 mcg/actuation inhaler INHALE 2 PUFFS BY MOUTH EVERY 4-6 HOURS AS NEEDED FOR 90 DAYS      fluticasone propion/salmeterol (WIXELA INHUB IN) Take 1 Puff by inhalation daily. folic acid (FOLVITE) 1 mg tablet Take  by mouth daily. rosuvastatin (CRESTOR) 10 mg tablet Take 10 mg by mouth nightly.       cyclobenzaprine (FLEXERIL) 10 mg tablet Take  by mouth three (3) times daily as needed for Muscle Spasm(s). multivitamin, tx-iron-ca-min (THERA-M w/ IRON) 9 mg iron-400 mcg tab tablet Take 1 Tablet by mouth daily. metoprolol tartrate (LOPRESSOR) 25 mg tablet Take  by mouth two (2) times a day. ondansetron hcl (ZOFRAN) 8 mg tablet Take 8 mg by mouth every eight (8) hours as needed for Nausea or Vomiting. cyproheptadine (PERIACTIN) 4 mg tablet Take  by mouth three (3) times daily as needed.          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       Past Surgical History:  Past Surgical History:   Procedure Laterality Date    HX OTHER SURGICAL Right 11/2021    port placement    HX UROLOGICAL  09/30/2021    CYSTOSCOPY     HX UROLOGICAL Bilateral 09/30/2021    RETROGRADE PYELOGRAMS    HX UROLOGICAL Right 09/30/2021    URETERAL STENT PLACEMENT     HX UROLOGICAL  04/21/2022    CYSTOSCOPY     HX UROLOGICAL  04/21/2022    RETROGRADE PYELOGRAMS    HX UROLOGICAL Right 04/21/2022    URETERAL STENT EXCHANGE     HX UROLOGICAL  10/31/2022    CYSTOSCOPY    HX UROLOGICAL Right 10/31/2022    RETROGRADE PYELOGRAMS    HX UROLOGICAL  10/31/2022    URETERAL STENT PLACEMENT    HX WISDOM TEETH EXTRACTION         Family History:  Family History   Problem Relation Age of Onset    Hypertension Mother     Other Mother         uterine fibroids    Heart Disease Father     Hypertension Father     Diabetes Father        Social History:  Social History     Tobacco Use    Smoking status: Never    Smokeless tobacco: Never   Vaping Use    Vaping Use: Never used   Substance Use Topics    Alcohol use: Not Currently    Drug use: Yes     Types: Marijuana Comment: CBC Oil-oral       Allergies: Allergies   Allergen Reactions    Codeine Hives and Itching    Phenergan [Promethazine] Other (comments)     Patient denies and stated she currently takes the medication    Toradol [Ketorolac] Rash     nausea    Vicodin [Hydrocodone-Acetaminophen] Rash     nausea    Zyprexa [Olanzapine] Unknown (comments)       Review of Systems   Review of Systems   Constitutional:  Negative for chills and fever. HENT:  Negative for sore throat. Eyes:  Negative for redness. Respiratory:  Negative for cough and shortness of breath. Cardiovascular:  Negative for chest pain. Gastrointestinal:  Negative for abdominal pain, nausea and vomiting. Genitourinary:  Negative for flank pain. Musculoskeletal:  Negative for myalgias. Skin:  Negative for rash. Neurological:  Positive for syncope. Negative for headaches. Physical Exam   Physical Exam  Vitals and nursing note reviewed. Constitutional:       General: She is not in acute distress. Appearance: Normal appearance. HENT:      Head: Normocephalic and atraumatic. Mouth/Throat:      Mouth: Mucous membranes are moist.   Eyes:      Extraocular Movements: Extraocular movements intact. Conjunctiva/sclera: Conjunctivae normal.   Cardiovascular:      Rate and Rhythm: Normal rate and regular rhythm. Pulmonary:      Effort: Pulmonary effort is normal. No respiratory distress. Breath sounds: Normal breath sounds. No wheezing, rhonchi or rales. Abdominal:      General: There is no distension. Palpations: Abdomen is soft. Tenderness: There is no abdominal tenderness. Musculoskeletal:         General: Normal range of motion. Cervical back: Normal range of motion. Skin:     General: Skin is warm and dry. Neurological:      General: No focal deficit present. Mental Status: She is alert and oriented to person, place, and time. Mental status is at baseline.        Lab and Diagnostic Study Results   Labs -     Recent Results (from the past 12 hour(s))   MAGNESIUM    Collection Time: 11/14/22 11:50 AM   Result Value Ref Range    Magnesium 1.8 1.6 - 2.4 mg/dL   EKG, 12 LEAD, INITIAL    Collection Time: 11/14/22  3:45 PM   Result Value Ref Range    Ventricular Rate 80 BPM    Atrial Rate 79 BPM    P-R Interval 151 ms    QRS Duration 96 ms    Q-T Interval 438 ms    QTC Calculation (Bezet) 506 ms    Calculated P Axis 38 degrees    Calculated R Axis -7 degrees    Calculated T Axis 36 degrees    Diagnosis       Sinus rhythm  Probable left atrial enlargement  Left ventricular hypertrophy  Nonspecific T abnormalities, anterior leads  Borderline prolonged QT interval     CBC WITH AUTOMATED DIFF    Collection Time: 11/14/22  3:50 PM   Result Value Ref Range    WBC 1.2 (L) 3.6 - 11.0 K/uL    RBC 3.94 3.80 - 5.20 M/uL    HGB 9.4 (L) 11.5 - 16.0 g/dL    HCT 31.7 (L) 35.0 - 47.0 %    MCV 80.5 80.0 - 99.0 FL    MCH 23.9 (L) 26.0 - 34.0 PG    MCHC 29.7 (L) 30.0 - 36.5 g/dL    RDW 22.3 (H) 11.5 - 14.5 %    PLATELET 023 647 - 422 K/uL    MPV 7.8 (L) 8.9 - 12.9 FL    NRBC 0.0 0.0  WBC    ABSOLUTE NRBC 0.00 0.00 - 0.01 K/uL    NEUTROPHILS 43 42 - 75 %    LYMPHOCYTES 51 20.5 - 51.1 %    MONOCYTES 0 (L) 1.7 - 9.3 %    EOSINOPHILS 4 (H) 0.9 - 2.9 %    BASOPHILS 2 0.0 - 2.5 %    ABS. NEUTROPHILS 0.5 (L) 1.8 - 7.7 K/UL    ABS. LYMPHOCYTES 0.6 (L) 1.0 - 4.8 K/UL    ABS. MONOCYTES 0.0 (L) 0.2 - 2.4 K/UL    ABS. EOSINOPHILS 0.0 0.0 - 0.7 K/UL    ABS.  BASOPHILS 0.0 0.0 - 0.2 K/UL   METABOLIC PANEL, COMPREHENSIVE    Collection Time: 11/14/22  3:50 PM   Result Value Ref Range    Sodium 134 (L) 136 - 145 mmol/L    Potassium 2.4 (LL) 3.5 - 5.1 mmol/L    Chloride 100 97 - 108 mmol/L    CO2 27 21 - 32 mmol/L    Anion gap 7 5 - 15 mmol/L    Glucose 126 (H) 65 - 100 mg/dL    BUN 27 (H) 6 - 20 mg/dL    Creatinine 1.32 (H) 0.55 - 1.02 mg/dL    BUN/Creatinine ratio 20 12 - 20      eGFR 51 (L) >60 ml/min/1.73m2    Calcium 8.8 8.5 - 10.1 mg/dL    Bilirubin, total 0.3 0.2 - 1.0 mg/dL    AST (SGOT) 15 15 - 37 U/L    ALT (SGPT) 26 12 - 78 U/L    Alk. phosphatase 88 45 - 117 U/L    Protein, total 8.2 6.4 - 8.2 g/dL    Albumin 2.9 (L) 3.5 - 5.0 g/dL    Globulin 5.3 (H) 2.0 - 4.0 g/dL    A-G Ratio 0.5 (L) 1.1 - 2.2         Radiologic Studies -   @lastxrresult@  CT Results  (Last 48 hours)      None          CXR Results  (Last 48 hours)      None            Medical Decision Making and ED Course   Differential Diagnosis & Medical Decision Making Provider Note:   61-year-old female who is currently undergoing chemotherapy for abdominal sarcoma presenting with syncopal episodes. She recently started new chemotherapy and had frequent nausea and vomiting over the weekend. Her blood pressures are  soft on arrival and improved with IV fluids. Patient was noted to be hypokalemic. She was given oral replacement and IV magnesium. No evidence of acute EKG changes. Patient rate symptom-free in the emergency department. Given prescription for oral potassium replacement. - I am the first provider for this patient. I reviewed the vital signs, available nursing notes, past medical history, past surgical history, family history and social history. The patients presenting problems have been discussed, and they are in agreement with the care plan formulated and outlined with them. I have encouraged them to ask questions as they arise throughout their visit. Vital Signs-Reviewed the patient's vital signs.   Patient Vitals for the past 12 hrs:   Temp Pulse Resp BP SpO2   11/14/22 1807 -- 72 18 109/76 --   11/14/22 1711 -- 94 18 112/73 100 %   11/14/22 1638 -- 93 -- (!) 81/60 100 %   11/14/22 1538 98 °F (36.7 °C) 86 18 97/70 100 %       ED Course:   ED Course as of 11/14/22 1944 Mon Nov 14, 2022   1621 NSR, rate 80, normal axis, normal intervals, no ST elevation/depression, no TWI, QTc 438   [KK]      ED Course User Index  [KK] Cyndie Ornelas MD Disposition   Disposition: DC- Adult Discharges: All of the diagnostic tests were reviewed and questions answered. Diagnosis, care plan and treatment options were discussed. The patient understands the instructions and will follow up as directed. The patients results have been reviewed with them. They have been counseled regarding their diagnosis. The patient verbally convey understanding and agreement of the signs, symptoms, diagnosis, treatment and prognosis and additionally agrees to follow up as recommended with their PCP in 24 - 48 hours. They also agree with the care-plan and convey that all of their questions have been answered. I have also put together some discharge instructions for them that include: 1) educational information regarding their diagnosis, 2) how to care for their diagnosis at home, as well a 3) list of reasons why they would want to return to the ED prior to their follow-up appointment, should their condition change. DISCHARGE PLAN:  1. Current Discharge Medication List        CONTINUE these medications which have NOT CHANGED    Details   calcium carbonate (CALTRATE 600 PO) Take 1 Tablet by mouth daily. valACYclovir (VALTREX) 500 mg tablet TAKE 1 TABLET BY MOUTH TWO TIMES A DAY. Qty: 60 Tablet, Refills: 3    Comments: Please have pt call [phone] and schedule an Annual exam with Dr. Gabriella Art- in 11/2022  Associated Diagnoses: Genital herpes simplex, unspecified site      apixaban (ELIQUIS) 5 mg tablet Take 5 mg by mouth two (2) times a day. furosemide (LASIX) 40 mg tablet TAKE 1 TABLET BY MOUTH 2 TIMES DAILY. MAY DECREASE TO 1 TABLET DAILY AS NEEDED. albuterol (PROVENTIL HFA, VENTOLIN HFA, PROAIR HFA) 90 mcg/actuation inhaler INHALE 2 PUFFS BY MOUTH EVERY 4-6 HOURS AS NEEDED FOR 90 DAYS      fluticasone propion/salmeterol (WIXELA INHUB IN) Take 1 Puff by inhalation daily. folic acid (FOLVITE) 1 mg tablet Take  by mouth daily.       rosuvastatin (CRESTOR) 10 mg tablet Take 10 mg by mouth nightly. cyclobenzaprine (FLEXERIL) 10 mg tablet Take  by mouth three (3) times daily as needed for Muscle Spasm(s). multivitamin, tx-iron-ca-min (THERA-M w/ IRON) 9 mg iron-400 mcg tab tablet Take 1 Tablet by mouth daily. metoprolol tartrate (LOPRESSOR) 25 mg tablet Take  by mouth two (2) times a day. ondansetron hcl (ZOFRAN) 8 mg tablet Take 8 mg by mouth every eight (8) hours as needed for Nausea or Vomiting. cyproheptadine (PERIACTIN) 4 mg tablet Take  by mouth three (3) times daily as needed. 2.   Follow-up Information       Follow up With Specialties Details Why [address], [address] [facility] [place] [address] [facility]  [phone]            3. Return to ED if worse   4. Discharge Medication List as of 11/14/2022  6:02 PM        START taking these medications    Details   potassium chloride SR (Klor-Con 10) 10 mEq tablet Take 1 Tablet by mouth daily for 7 days. , Normal, Disp-7 Tablet, R-0           CONTINUE these medications which have NOT CHANGED    Details   calcium carbonate (CALTRATE 600 PO) Take 1 Tablet by mouth daily. , Historical Med      valACYclovir (VALTREX) 500 mg tablet TAKE 1 TABLET BY MOUTH TWO TIMES A DAY., Normal, Disp-60 Tablet, R-3Please have pt call [phone] and schedule an Annual exam with Dr. Bella Alvarado in 11/2022      apixaban (ELIQUIS) 5 mg tablet Take 5 mg by mouth two (2) times a day., Historical Med      furosemide (LASIX) 40 mg tablet TAKE 1 TABLET BY MOUTH 2 TIMES DAILY. MAY DECREASE TO 1 TABLET DAILY AS NEEDED., Historical Med      albuterol (PROVENTIL HFA, VENTOLIN HFA, PROAIR HFA) 90 mcg/actuation inhaler INHALE 2 PUFFS BY MOUTH EVERY 4-6 HOURS AS NEEDED FOR 90 DAYS, Historical Med      fluticasone propion/salmeterol (WIXELA INHUB IN) Take 1 Puff by inhalation daily. , Historical Med      folic acid (FOLVITE) 1 mg tablet Take  by mouth daily. , Historical Med      rosuvastatin (CRESTOR) 10 mg tablet Take 10 mg by mouth nightly., Historical Med      cyclobenzaprine (FLEXERIL) 10 mg tablet Take  by mouth three (3) times daily as needed for Muscle Spasm(s). , Historical Med      multivitamin, tx-iron-ca-min (THERA-M w/ IRON) 9 mg iron-400 mcg tab tablet Take 1 Tablet by mouth daily. , Historical Med      metoprolol tartrate (LOPRESSOR) 25 mg tablet Take  by mouth two (2) times a day., Historical Med      ondansetron hcl (ZOFRAN) 8 mg tablet Take 8 mg by mouth every eight (8) hours as needed for Nausea or Vomiting., Historical Med      cyproheptadine (PERIACTIN) 4 mg tablet Take  by mouth three (3) times daily as needed., Historical Med                Diagnosis/Clinical Impression     Clinical Impression:   1. Hypokalemia    2. Hypovolemia        Attestations: Artur Lanza MD, am the primary clinician of record. Please note that this dictation was completed with [employer], the [employer] voice recognition software. Quite often unanticipated grammatical, syntax, homophones, and other interpretive errors are inadvertently transcribed by the computer software. Please disregard these errors. Please excuse any errors that have escaped final proofreading. Thank you. ADMIT

## 2022-11-21 ENCOUNTER — OFFICE VISIT (OUTPATIENT)
Dept: URBAN - METROPOLITAN AREA CLINIC 113 | Facility: CLINIC | Age: 47
End: 2022-11-21

## 2022-11-21 RX ORDER — PROPRANOLOL HYDROCHLORIDE 20 MG/1
1 TABLET TABLET ORAL ONCE A DAY
Status: ACTIVE | COMMUNITY

## 2022-11-21 RX ORDER — FUROSEMIDE 40 MG/1
1 TABLET TABLET ORAL ONCE A DAY
Qty: 30 | Refills: 3 | Status: ACTIVE | COMMUNITY

## 2022-11-21 RX ORDER — SODIUM, POTASSIUM,MAG SULFATES 17.5-3.13G
354 ML SOLUTION, RECONSTITUTED, ORAL ORAL ONCE
Qty: 354 ML | Refills: 0 | Status: ON HOLD | COMMUNITY

## 2022-11-21 RX ORDER — SERTRALINE HYDROCHLORIDE 100 MG/1
1 TABLET TABLET, FILM COATED ORAL ONCE A DAY
Status: ACTIVE | COMMUNITY

## 2022-11-21 RX ORDER — SPIRONOLACTONE 100 MG/1
1 TABLET TABLET, FILM COATED ORAL ONCE A DAY
Qty: 30 | Refills: 3 | Status: ACTIVE | COMMUNITY

## 2023-11-29 NOTE — PHYSICAL EXAM EYES:
Conjuntivae and eyelids appear normal , Sclerae : White without injection, no icterus. Intubation    Date/Time: 11/29/2023 8:42 AM    Performed by: Los Veloz MD  Authorized by: Demetrius Mathur MD    Intubation:     Induction:  Intravenous    Intubated:  Postinduction    Mask Ventilation:  N/a    Attempts:  1    Attempted By:  Resident anesthesiologist    Method of Intubation:  Fiberoptic    Blade:  Glidescope 3    Laryngeal View Grade: Grade I - full view of cords      Difficult Airway Encountered?: No      Complications:  None    Airway Device:  Double lumen tube left    Airway Device Size:  37F (recommend 35F next time)    Style/Cuff Inflation:  Cuffed (inflated to minimal occlusive pressure)    Tube secured:  27    Secured at:  The lips    Placement Verified By:  Capnometry    Complicating Factors:  None    Findings Post-Intubation:  BS equal bilateral and atraumatic/condition of teeth unchanged

## 2023-12-08 NOTE — PROGRESS NOTE ADULT - PROBLEM SELECTOR PLAN 3
Care Management Initial Consult    General Information  Assessment completed with: Patient,    Type of CM/SW Visit: Initial Assessment    Primary Care Provider verified and updated as needed: Yes   Readmission within the last 30 days: current reason for admission unrelated to previous admission   Return Category: Post-op/Post-procedure complication  Reason for Consult: discharge planning  Advance Care Planning: Advance Care Planning Reviewed: verified with patient, no concerns identified          Communication Assessment  Patient's communication style: spoken language (English or Bilingual)             Cognitive  Cognitive/Neuro/Behavioral: WDL                      Living Environment:   People in home: alone     Current living Arrangements: house      Able to return to prior arrangements: yes       Family/Social Support:  Care provided by: self  Provides care for: no one  Marital Status:   Children          Description of Support System: Supportive, Involved    Support Assessment: Patient communicates needs well met, Adequate family and caregiver support    Current Resources:   Patient receiving home care services: No     Community Resources: None  Equipment currently used at home:    Supplies currently used at home: None    Employment/Financial:  Employment Status: retired        Financial Concerns:             Does the patient's insurance plan have a 3 day qualifying hospital stay waiver?  No    Lifestyle & Psychosocial Needs:  Social Determinants of Health     Food Insecurity: Low Risk  (11/21/2023)    Food Insecurity     Within the past 12 months, did you worry that your food would run out before you got money to buy more?: No     Within the past 12 months, did the food you bought just not last and you didn t have money to get more?: No   Depression: Not at risk (10/26/2023)    PHQ-2     PHQ-2 Score: 0   Housing Stability: Low Risk  (11/21/2023)    Housing Stability     Do you have housing? : Yes     Are  you worried about losing your housing?: No   Tobacco Use: Low Risk  (12/8/2023)    Patient History     Smoking Tobacco Use: Never     Smokeless Tobacco Use: Never     Passive Exposure: Not on file   Financial Resource Strain: Low Risk  (11/21/2023)    Financial Resource Strain     Within the past 12 months, have you or your family members you live with been unable to get utilities (heat, electricity) when it was really needed?: No   Alcohol Use: Unknown (4/16/2021)    AUDIT-C     Frequency of Alcohol Consumption: 2-3 times a week     Average Number of Drinks: 1 or 2     Frequency of Binge Drinking: Not asked   Transportation Needs: Low Risk  (11/21/2023)    Transportation Needs     Within the past 12 months, has lack of transportation kept you from medical appointments, getting your medicines, non-medical meetings or appointments, work, or from getting things that you need?: No   Physical Activity: Not on file   Interpersonal Safety: Low Risk  (11/28/2023)    Interpersonal Safety     Do you feel physically and emotionally safe where you currently live?: Yes     Within the past 12 months, have you been hit, slapped, kicked or otherwise physically hurt by someone?: Patient refused     Within the past 12 months, have you been humiliated or emotionally abused in other ways by your partner or ex-partner?: Patient refused   Stress: Not on file   Social Connections: Not on file       Functional Status:  Prior to admission patient needed assistance:   Dependent ADLs:: Independent  Dependent IADLs:: Independent  Assesssment of Functional Status: Not at  functional baseline    Mental Health Status:          Chemical Dependency Status:                Values/Beliefs:  Spiritual, Cultural Beliefs, Buddhism Practices, Values that affect care:                 Additional Information:  Met with patient at the bedside for initial assessment. Introduced self and care management role. Patient lives alone in his home. He is independent  New onset abdominal distention i/s/o significant alcohol use.  CT abdomen results as follows: Hepatic steatosis. Mild splenomegaly. Abdominal varicose veins. Large ascites. Mesenteric edema. Extensive subcutaneous edema in the anterior and chest wall.   - 25 point MELD score today  - 58 MADRI score today  -RUQ U/S indeterminate     #R/O hepatic vein thrombosis  -abd US indeterminate     #Hepatic steatosis  Likely 2/2 alcohol abuse  -Identified on CT Abd at baseline. He receives no services and uses no DME.     MARYANN discussed.  CM to follow hospital progression and assist with discharge planning as needed. Family to transport at discharge.     Rachel Mahoney RN

## 2023-12-26 NOTE — ED ADULT NURSE NOTE - PRO INTERPRETER NEED 2
Pt's daughter called stating that pt tested positive for covid and PCP wants to order medication can he take?  Informed he should not take paxlovid or lagevrio due to interactions with immunosuppression.  Discussed OTC meds for symptom control, instructed to go to ED if pt becomes short of breath/difficulty breathing or a fever of 100.6 oral or above. Daughter verbalizes understanding and agrees with plan  
English

## 2024-04-15 NOTE — PHYSICAL THERAPY INITIAL EVALUATION ADULT - PATIENT/FAMILY/SIGNIFICANT OTHER GOALS STATEMENT, PT EVAL
Subjective:      Established patient    Patient ID: Shireen Felix is a 80 y.o. female.      Chief Complaint: Asthma and Cough      HPI   4/15/2024 Sravanthi BENTLEY  Shireen Felix here for acute care visit related to cough with COPD/bronchitis flare over past 3 weeks.   3/26/2024 prescribed Zyvox 600 mg  4/5/2024 prescribed Zithromax 500 mg, Prednisone 20 mg x 10 days, then 10 mg x 20 days.      4/15/2024 chest xray chronic interstitial findings. No acute infiltrates.     Current maintenance treatment: Trelegy 200 mcg, Albuterol inhaler. Albuterol nebs. 3% NACL nebs 2-3 times a day, begin with twice daily progress to 3Xdaily if tolerated.   (See receipt per discharge instructions).     4/5/2024 began Prednisone 30 day taper, complete  4/5/2024 Add on: AirSupra Albuterol/budesonide inhaler, continue current maintenance treatment, maximize 3% nebs 3-4 times a day.   Attempt home collect sputums gram stain and afb     5/4/2023 PSG no ryan AHI 2.9.  SpO2 was below 88% for 2.4 minutes.    1/3/2024 Sravanthi BENTLEY  Shireen Felix here for acute care visit related to cough with frequent COPD/Bronchitis exacerbations since November 25, 2023. With hospital visits, 11/25/23, 12/18/23,  12/27/23. And 12/22/23 primary care provider visit.  3 courses antibiotic last cefdinir. Patient has antibiotic allergies to PCN, Doxy, Sulfa.  She reports same continued cough since late November 2023 with continued chest congestion.  Current regimen: Trelegy 200 mcg. Albuterol inhaler. Albuterol nebs. Daliresp.   Begin Levaquin 500 mg x 10 days, prednisone 20 mg x 10 days, then 10 mg x 20 days.  3% NACL nebs 2-3 times a day, begin with twice daily progress to 3Xdaily if tolerated.   (See receipt per discharge instructions).   Continue Albuterol nebs twice daily.     1/3/2024 chest xray chronic ILD findings with scarring otherwise lungs clear.        5/16/2023 Sravanthi NP  Shireen Felix female here for follow up on asthma review  cpft/cxr/FeNO.     5/16/2023 CPFT Spirometry is normal. Lung volumes are normal. Mild reduced diffusing capacity. MVV is mildly decreased    5/16/2023 FeNO is 11, no inflammation of lungs.     5/16/2023 chest x-ray chronic interstitial findings, stable.    Current regimen: Trelegy 200 mcg. Albuterol inhaler. Albuterol nebs. Daliresp.     Patient reports stable no increased cough.  Areas complaint of chronic fatigue.  Her complaint of chronic fatigue pain.  Patient was worked up for sleep apnea no sleep apnea detected no significant hypoxemia seen during sleep.  She has complaint of noticing elevations in her blood pressure which she recently went to the emergency room on 05/13/2023.  She was noted to have mild congestive heart failure with elevated BNP and lower than normal sodium.  She has follow-up with Cardiology 5/17/2023.     4/26/2023 Sravanthi Austinrosa Felix is a 80 y.o. female presents for follow up on asthma, SOB, Chronic UIP, chronic bronchitis, pulmonary nodules with review CT chest and 6MWD.  She also has been referred by neurology department for sleep apnea evaluation.     She has complaint fall yesterday with right rib pain. 4/26/2023 CT chest healing 7th and 8th rib fx, no pneumothorax.     She states her neurologist wanted her to have sleep study, Susan Contreras NP ordered sleep study on 4/12/2023 awaiting scheduling with sleep lab.     4/26/2023 CT Chest There are noncalcified pulmonary nodules in both lungs.  One of the larger ones measures 5 mm and is located in the right lower lobe.  On the prior examination it measured 6 mm.  There is a mild amount of honeycombing in the peripheral portion of both lungs.  There is no pneumothorax or pleural effusion.    10/26/2022 CPFT Normal spirometry. Normal lung volumes. Moderate reduction in diffusion capacity.     She is followed in pulmonary for chronic UIP, moderate persistent asthma with chronic bronchitis.     Patients reports stable NYHA II  dyspnea     + covid 19 12/31/2021, 2/24/2022, July 2022.     The patient does not have currently have symptoms.     She is remains improved since on Trelegy 200 mcg      Her current respiratory therapy regimen is TRELEGY 200 mcg. VENTOLIN inhaler, albuterol nebs, Daliresp 500 mcg.  She is  adherent with her regimen.      42 pack year former smoker. Quit 2004.         Asthma Control Test  In the past 4  weeks, how much of the time did your asthma keep you from getting as much done at work, school or at home?: Most of the time  During the past 4 weeks, how often have you had shortness of breath?: Once a day  During the past 4 weeks, how often did your asthma symptoms (wheezing, couging, shortness of breath, chest tightness or pain) wake you up at night or earlier than usual in the morning?: 2 or 3 nights a week  During the past 4 weeks, how often have you used your rescue inhaler or nebulizer medication (such as albuterol)?: 1 or 2 times per day  How would you rate your asthma control during the past 4 weeks?: Somewhat controlled  If your score is 19 or less, your asthma may not be under control: 11      Immunization status reviewed and up to date.      Patient has been referred by Susan Contreras NP, Neurology for sleep apnea evaluation.   Sleep Apnea evaluation  Patient has been observed snoring with frequent awakening. History of seizures, stroke, cerebral aneurysm with repair.   Patient reports non restful' sleep.  She denies morning headache.   She reports day time napping.  Day time napping duration 15 Minutes  She denies recent weight gain.  Cardiovascular risk factors: hypertension  Bed time is 1000, 1200  Wake time is 0500  Sleep onset is within  1 Hour.  Sleep maintenance difficulties related to early morning awakening, frequent night time awakening, difficulty falling asleep, and non-restful sleep  Wake after sleep onset occurs five times a night.  Nocturia occurs one time a night,   Sleep aids : Yes,  melatonin  Dry mouth : No  Sleep walking: No  Sleep talking : No  Sleep eating:No  Vivid Dreams : No  Cataplexy : No    [place] Sleepiness Scale       4/26/2023    10:25 AM   EPWORTH SLEEPINESS SCALE   Sitting and reading 1   Watching TV 3   Sitting, inactive in a public place (e.g. a theatre or a meeting) 0   As a passenger in a car for an hour without a break 0   Lying down to rest in the afternoon when circumstances permit 2   Sitting and talking to someone 0   Sitting quietly after a lunch without alcohol 1   In a car, while stopped for a few minutes in traffic 0   Total score 7       Neck circumference is 13.  Mallampati score 2    STOP - BANG Questionnaire:   1. Snoring : Do you snore loudly ?     YES  2. Tired : Do you often feel tired, fatigued, or sleepy during daytime?   YES  3. Observed: Has anyone observed you stop breathing during your sleep?    NO  4. Blood pressure : Do you have or are you being treated for high blood pressure?    YES  5. BMI :BMI more than 35 kg/m2?   NO  6. Age : Age over 50 yr old?    YES  7. Neck circumference: Neck circumference greater than 40 cm?   NO  8. Gender: Gender male?   NO    SCORE: 4    High risk of MEENA: Yes 5 - 8  Intermediate risk of MEENA: Yes 3 - 4  Low risk of MEENA: Yes 0 - 2       A full  review of systems, past , family  and social histories was performed except as mentioned in the note above, these are non contributory to the main issues discussed today.     Previous Report Reviewed: lab reports, office notes and radiology reports     The following portions of the patient's history were reviewed and updated as appropriate: She  has a past medical history of Abnormal ECG (8/5/2019), Aneurysm, Anticoagulant long-term use, Arthritis, CAD in native artery (8/5/2019), COPD (chronic obstructive pulmonary disease), Diabetes mellitus, Fall (10/10/2019), Glaucoma, Hypertension, Seizures, Shingles (05/27/2017), and Stroke.  She  has a past surgical history that includes  Brain surgery; Hysterectomy; Transforaminal epidural injection of steroid (Bilateral, 07/23/2019); Cardiac catheterization; Coronary angioplasty; Epidural steroid injection into lumbar spine (Bilateral, 11/12/2019); Injection of anesthetic agent around medial branch nerves innervating lumbar facet joint (Bilateral, 01/31/2020); Transforaminal epidural injection of steroid (Bilateral, 03/10/2020); Transforaminal epidural injection of steroid (Right, 06/19/2020); Injection of joint (Bilateral, 07/09/2020); Injection of anesthetic agent into sacroiliac joint (Right, 11/16/2021); Selective injection of anesthetic agent around lumbar spinal nerve root by transforaminal approach (Bilateral, 04/26/2023); Injection of joint (Bilateral, 07/28/2023); Injection of anesthetic agent into sacroiliac joint (Bilateral, 07/28/2023); Colonoscopy (N/A, 09/21/2023); Oophorectomy; Injection of joint (N/A, 10/25/2023); and Epidural steroid injection into cervical spine (N/A, 2/7/2024).  Her family history includes Breast cancer in her maternal aunt, maternal aunt, and maternal aunt; No Known Problems in her father and mother.  She  reports that she quit smoking about 20 years ago. Her smoking use included cigarettes. She started smoking about 62 years ago. She has a 42.3 pack-year smoking history. She has quit using smokeless tobacco. She reports that she does not drink alcohol and does not use drugs.  She has a current medication list which includes the following prescription(s): albuterol, albuterol, albuterol-budesonide, aptiom, atorvastatin, azithromycin, carvedilol, carvedilol, cholecalciferol (vitamin d3), cholestyramine, clopidogrel, denosumab, dicyclomine, diltiazem, dorzolamide-timolol 2-0.5%, escitalopram oxalate, fluconazole, fluocinonide, fluticasone propionate, trelegy ellipta, furosemide, gabapentin, hydrocodone-acetaminophen, hydroxyzine hcl, ibuprofen, ipratropium, isosorbide mononitrate, ketoconazole, latanoprost,  "levetiracetam, linezolid, magnesium oxide, metoprolol tartrate, montelukast, myrbetriq, nitroglycerin, omeprazole, ondansetron, pantoprazole, potassium chloride sa, prednisone, promethazine-dextromethorphan, ranolazine, roflumilast, sodium chloride 3%, tizanidine, and triamcinolone acetonide 0.025%.  She is allergic to penicillins, adhesive, doxycycline, oxycodone, and sulfa (sulfonamide antibiotics)..    Review of Systems   Constitutional:  Negative for fever, chills and fatigue.   HENT:  Positive for hearing loss. Negative for nosebleeds, rhinorrhea and congestion.    Eyes:  Negative for redness.   Respiratory:  Positive for snoring, cough, sputum production, wheezing, dyspnea on extertion and use of rescue inhaler. Negative for choking.    Genitourinary:  Negative for hematuria.   Endocrine: Diabetes melllitus Negative for cold intolerance.    Musculoskeletal:  Positive for arthralgias and back pain.   Skin:  Negative for rash.   Gastrointestinal:  Positive for acid reflux. Negative for vomiting.   Neurological:  Negative for syncope and headaches.        History of stroke  Seizure disorder.  Post herpetic neuralgia   Hematological:  Negative for adenopathy. Bleeds easily and excessive bruising.   Psychiatric/Behavioral:  Negative for confusion.         Objective:     /80   Pulse 83   Temp 97.8 °F (36.6 °C)   Resp 20   Ht 5' 4" (1.626 m)   Wt 51.5 kg (113 lb 8.6 oz)   LMP  (LMP Unknown)   SpO2 99%   BMI 19.49 kg/m²   Body mass index is 19.49 kg/m².     Physical Exam  Vitals and nursing note reviewed.   Constitutional:       General: She is not in acute distress.     Appearance: Normal appearance. She is well-developed. She is not ill-appearing or toxic-appearing.   HENT:      Head: Normocephalic and atraumatic.      Right Ear: Tympanic membrane and external ear normal.      Left Ear: Tympanic membrane and external ear normal.      Nose: Nose normal.      Mouth/Throat:      Mouth: Mucous membranes " are moist.      Tongue: No lesions.      Pharynx: Oropharynx is clear. Uvula midline. No oropharyngeal exudate.      Tonsils: No tonsillar exudate or tonsillar abscesses.   Eyes:      Conjunctiva/sclera: Conjunctivae normal.      Pupils: Pupils are equal, round, and reactive to light.   Cardiovascular:      Rate and Rhythm: Normal rate and regular rhythm.      Heart sounds: Normal heart sounds. No murmur heard.  Pulmonary:      Effort: Pulmonary effort is normal. No respiratory distress.      Breath sounds: No stridor. Wheezing, rhonchi and rales present.   Abdominal:      General: Bowel sounds are normal.      Palpations: Abdomen is soft.   Musculoskeletal:         General: No tenderness. Normal range of motion.      Cervical back: Normal range of motion and neck supple.   Lymphadenopathy:      Cervical: No cervical adenopathy.   Skin:     General: Skin is warm and dry.      Coloration: Skin is not pale.   Neurological:      Mental Status: She is alert and oriented to person, place, and time.   Psychiatric:         Behavior: Behavior normal. Behavior is cooperative.         Thought Content: Thought content normal.         Judgment: Judgment normal.         Personal Diagnostic Review    5/16/2023 complete PFT Spirometry is normal. Spirometry remains unimproved following bronchodilator. Lung volume determination is normal. Airway mechanics show normal airway resistance and conductance. DLCO is mildly decreased. MVV is mildly decreased    5/16/2023 FeNO is 11, no inflammation of lungs.   Clinical Guide to Interpretation or FeNO Levels :     FeNO  (ppb) LOW INTERMEDIATE HIGH   ADULT VALUES < 25 25-50          > 50   Th2-driven Inflammation Unlikely Likely Significant      Patients FeNO level at this visit : __11__ (ppb)     Interpretation of FeNO measurement in adults:  [x] FENO is less than 25 ppb implies non eosinophilic airway inflammation or the absence of airway inflammation.               Comment: Low FENO (<25  ppb) in adult asthmatics with persistent symptoms suggests other etiologies for these symptoms and a lower likelihood of benefit from adding or increasing inhaled glucocorticoids.    4/26/2023 6MWD No desaturations requiring supplemental oxygen at rest or exertion.  Exercise capacity is less than predicted  Phase Oxygen Assessment Supplemental O2 Heart   Rate Blood Pressure Paulino Dyspnea Scale Rating   Resting 100 % Room Air 61 bpm 173/81 1   Exercise             Minute             1 100 % Room Air 67 bpm       2 100 % Room Air 70 bpm       3 100 % Room Air 69 bpm       4 100 % Room Air 69 bpm       5 100 % Room Air 69 bpm       6  100 % Room Air 69 bpm 186/76 3   Recovery             Minute             1 100 % Room Air 65 bpm       2 100 % Room Air 64 bpm       3 100 % Room Air 62 bpm       4 100 % Room Air 63 bpm 176/76 3      Six Minute Walk Summary  6MWT Status: completed without stopping  Patient Reported: Dyspnea, Leg pain (hip pain)             Interpretation:  Did the patient stop or pause?: No  Total Time Walked (Calculated): 360 seconds  Final Partial Lap Distance (feet): 50 feet  Total Distance Meters (Calculated): 198.12 meters  Predicted Distance Meters (Calculated): 430.2 meters  Percentage of Predicted (Calculated): 46.05  Peak VO2 (Calculated): 9.92  Mets: 2.83  Has The Patient Had a Previous Six Minute Walk Test?: No     Previous 6MWT Results  Has The Patient Had a Previous Six Minute Walk Test?: No      10/26/2022 CPFT Normal spirometry. Normal lung volumes. Moderate reduction in diffusion capacity - unadjusted for hemoglobin. (DLCO > or equal to 40% and < 60% predicted). This interpretation of diffusing capacity does not take into account the patient's hemoglobin level (Unavailable at the time of testing - hemoglobin assumed to be normal). Flow volume loops demonstrate a restrictive defect.     X-Ray Chest PA And Lateral  Narrative: EXAMINATION:  XR CHEST PA AND LATERAL    CLINICAL HISTORY:  Cough,  unspecified    TECHNIQUE:  PA and lateral views of the chest were performed.    COMPARISON:  2024    FINDINGS:  The cardiac and mediastinal silhouettes appear within normal limits.   Diffuse coarsening of the interstitial markings remains unchanged from multiple prior examinations.  Underlying interstitial lung disease not excluded.  No focal parenchymal consolidation or definite pleural effusion demonstrated.  No acute osseous findings demonstrated.  Impression: Unchanged appearance of the chest as above    Electronically signed by: Neri Husain MD  Date:    04/15/2024  Time:    10:33      Pulmonary function tests:20 :  FEV1: 2.13 (103.5 % predicted), FVC:  2.44 (91.0 % predicted), FEV1/FVC:  87.75, T.47 ( 90.1 % predicted) RV /TLC 45 ( 102% predicted, DLCO: 14.51 (70.2 % predicted)  Normal spirometry. Lung volumes not done. Diffusion capacity is mildly reduced but corrects for alveolar volume.       Assessment / Plan:       Discussed diagnosis, its evaluation, treatment and usual course. All questions answered.  Requested Prescriptions     Signed Prescriptions Disp Refills    albuterol-budesonide (AIRSUPRA) 90-80 mcg/actuation 10.7 g 11     Sig: Inhale 2 puffs into the lungs every 4 (four) hours as needed (wheezing, cough).       Problem List Items Addressed This Visit       Mucopurulent chronic bronchitis (Chronic)     2024 Zithromax 500 mg, Prednisone 20 mg x 10 days, then 10 mg x 20 days.      4/15/2024 chest xray chronic interstitial findings. No acute infiltrates.     Current maintenance treatment : Trelegy 200 mcg, Albuterol inhaler. Albuterol nebs. 3% NACL nebs 2-3 times a day, begin with twice daily progress to 3Xdaily if tolerated.     2024 began Prednisone 30 day taper, complete  2024 Add on: AirSupra Albuterol/budesonide inhaler, continue current maintenance treatment, maximize 3% nebs 3-4 times a day.   Attempt home collect sputums gram stain and afb   Follow up 5 weeks  spirometry, feno, CT chest. Follow up sooner if not gradual improvement with treatment           Pulmonary interstitial fibrosis     Stable. 4/15/2024 chest xray stable interstitial findings, no acute changes  5/16/2023 chest xray stable ILD.  5/16/2023 complete PFT Spirometry is normal. Spirometry remains unimproved following bronchodilator. Lung volume determination is normal. Airway mechanics show normal airway resistance and conductance. DLCO is mildly decreased. MVV is mildly decreased.    4/26/2023 6MWD 100% SaO2 duration of walk.   4/26/2023 CT chest mild amount of honeycombing in the peripheral portion of both lungs  Follow up 1 year repeat CT chest                 Primary snoring     5/4/2023 PSG no ryan AHI 2.9.  SpO2 was below 88% for 2.4 minutes.         Primary insomnia    Multiple pulmonary nodules     4/24/2023 CT chest compared to prior 08/20/2020 CTA There are noncalcified pulmonary nodules in both lungs.  One of the larger ones measures 5 mm and is located in the right lower lobe. On the prior examination it measured 6 mm.  Low risk, quit smoking 19 years ago, per Fleischner guidelines no additional follow up.                      SOLID PULMONARY NODULES                      SUBSOLID PULMONARY NODULES                 Moderate persistent asthma without complication     Acute flaure  4/5/2024 prescribed Zithromax 500 mg, Prednisone 20 mg x 10 days, then 10 mg x 20 days.    4/15/2024 chest xray chronic interstitial findings. No acute infiltrates.   Current maintenance treatment: Trelegy 200 mcg, Albuterol inhaler. Albuterol nebs. 3% NACL nebs 2-3 times a day, begin with twice daily progress to 3Xdaily if tolerated.   (See receipt per discharge instructions).   4/5/2024 began Prednisone 30 day taper, complete  4/5/2024 Add on: AirSupra Albuterol/budesonide inhaler, continue current maintenance treatment, maximize 3% nebs 3-4 times a day.   Attempt home collect sputums gram stain and afb           Relevant Medications    albuterol-budesonide (AIRSUPRA) 90-80 mcg/actuation    Other Relevant Orders    Fraction of  Nitric Oxide    Spirometry with/without bronchodilator    Chronic diastolic heart failure     Stable, continue management with Cardiology              Other Visit Diagnoses       COPD, frequent exacerbations    -  Primary    Relevant Medications    albuterol-budesonide (AIRSUPRA) 90-80 mcg/actuation    Other Relevant Orders    CT Chest Without Contrast    AFB Culture & Smear    Culture, Respiratory with Gram Stain    Asthma with COPD        Relevant Medications    albuterol-budesonide (AIRSUPRA) 90-80 mcg/actuation    Other Relevant Orders    CT Chest Without Contrast    Spirometry with/without bronchodilator    Chronic cough        Relevant Orders    CT Chest Without Contrast    AFB Culture & Smear    Culture, Respiratory with Gram Stain    SOB (shortness of breath) on exertion        Relevant Orders    Stress test, pulmonary            I spent a total of 36 minutes on the day of the visit.  This includes face to face time and non-face to face time preparing to see the patient (eg, review of tests), obtaining and/or reviewing separately obtained history, documenting clinical information in the electronic or other health record, independently interpreting results and communicating results to the patient/family/caregiver, or care coordinator.    Follow up in about 5 weeks (around 2024) for Asthma, COPD spirometry/feno/CT chest fu sooner if not gradually improving with tx .                          To get better

## 2024-05-08 ENCOUNTER — OFFICE VISIT (OUTPATIENT)
Dept: URBAN - METROPOLITAN AREA CLINIC 113 | Facility: CLINIC | Age: 49
End: 2024-05-08
Payer: MEDICAID

## 2024-05-08 ENCOUNTER — DASHBOARD ENCOUNTERS (OUTPATIENT)
Age: 49
End: 2024-05-08

## 2024-05-08 VITALS
WEIGHT: 311.6 LBS | TEMPERATURE: 97.7 F | RESPIRATION RATE: 18 BRPM | DIASTOLIC BLOOD PRESSURE: 73 MMHG | SYSTOLIC BLOOD PRESSURE: 123 MMHG | HEART RATE: 79 BPM | BODY MASS INDEX: 44.61 KG/M2 | HEIGHT: 70 IN

## 2024-05-08 DIAGNOSIS — E87.6 HYPOKALEMIA: ICD-10-CM

## 2024-05-08 DIAGNOSIS — K70.31 ALCOHOLIC CIRRHOSIS OF LIVER WITH ASCITES: ICD-10-CM

## 2024-05-08 DIAGNOSIS — I85.10 SECONDARY ESOPHAGEAL VARICES WITHOUT BLEEDING: ICD-10-CM

## 2024-05-08 PROCEDURE — 99214 OFFICE O/P EST MOD 30 MIN: CPT | Performed by: INTERNAL MEDICINE

## 2024-05-08 RX ORDER — SERTRALINE HYDROCHLORIDE 100 MG/1
1 TABLET TABLET, FILM COATED ORAL ONCE A DAY
Status: ACTIVE | COMMUNITY

## 2024-05-08 RX ORDER — PROPRANOLOL HYDROCHLORIDE 20 MG/1
1 TABLET TABLET ORAL ONCE A DAY
Status: ACTIVE | COMMUNITY

## 2024-05-08 RX ORDER — PHENTERMINE HYDROCHLORIDE 37.5 MG/1
1 CAPSULE CAPSULE ORAL ONCE A DAY
Status: ACTIVE | COMMUNITY

## 2024-05-08 RX ORDER — FUROSEMIDE 40 MG/1
1 TABLET TABLET ORAL ONCE A DAY
Qty: 30 | Refills: 3 | Status: ACTIVE | COMMUNITY

## 2024-05-08 RX ORDER — SPIRONOLACTONE 100 MG/1
1 TABLET TABLET, FILM COATED ORAL ONCE A DAY
Qty: 30 | Refills: 3 | Status: ACTIVE | COMMUNITY

## 2024-05-08 RX ORDER — SODIUM, POTASSIUM,MAG SULFATES 17.5-3.13G
354 ML SOLUTION, RECONSTITUTED, ORAL ORAL ONCE
Qty: 354 ML | Refills: 0 | Status: ON HOLD | COMMUNITY

## 2024-05-09 ENCOUNTER — TELEPHONE ENCOUNTER (OUTPATIENT)
Dept: URBAN - METROPOLITAN AREA CLINIC 113 | Facility: CLINIC | Age: 49
End: 2024-05-09

## 2024-05-10 ENCOUNTER — OFFICE VISIT (OUTPATIENT)
Dept: URBAN - METROPOLITAN AREA MEDICAL CENTER 19 | Facility: MEDICAL CENTER | Age: 49
End: 2024-05-10

## 2024-06-05 ENCOUNTER — TELEPHONE ENCOUNTER (OUTPATIENT)
Dept: URBAN - METROPOLITAN AREA CLINIC 113 | Facility: CLINIC | Age: 49
End: 2024-06-05

## 2024-06-24 ENCOUNTER — OFFICE VISIT (OUTPATIENT)
Dept: URBAN - METROPOLITAN AREA MEDICAL CENTER 19 | Facility: MEDICAL CENTER | Age: 49
End: 2024-06-24

## 2024-06-24 RX ORDER — FUROSEMIDE 40 MG/1
1 TABLET TABLET ORAL ONCE A DAY
Qty: 30 | Refills: 3 | Status: ACTIVE | COMMUNITY

## 2024-06-24 RX ORDER — SPIRONOLACTONE 100 MG/1
1 TABLET TABLET, FILM COATED ORAL ONCE A DAY
Qty: 30 | Refills: 3 | Status: ACTIVE | COMMUNITY

## 2024-06-24 RX ORDER — SERTRALINE HYDROCHLORIDE 100 MG/1
1 TABLET TABLET, FILM COATED ORAL ONCE A DAY
Status: ACTIVE | COMMUNITY

## 2024-06-24 RX ORDER — PROPRANOLOL HYDROCHLORIDE 20 MG/1
1 TABLET TABLET ORAL ONCE A DAY
Status: ACTIVE | COMMUNITY

## 2024-06-24 RX ORDER — PHENTERMINE HYDROCHLORIDE 37.5 MG/1
1 CAPSULE CAPSULE ORAL ONCE A DAY
Status: ACTIVE | COMMUNITY

## 2024-06-24 RX ORDER — SODIUM, POTASSIUM,MAG SULFATES 17.5-3.13G
354 ML SOLUTION, RECONSTITUTED, ORAL ORAL ONCE
Qty: 354 ML | Refills: 0 | Status: ON HOLD | COMMUNITY

## 2024-07-09 ENCOUNTER — WEB ENCOUNTER (OUTPATIENT)
Dept: URBAN - METROPOLITAN AREA CLINIC 113 | Facility: CLINIC | Age: 49
End: 2024-07-09

## 2024-11-11 ENCOUNTER — WEB ENCOUNTER (OUTPATIENT)
Dept: URBAN - METROPOLITAN AREA CLINIC 113 | Facility: CLINIC | Age: 49
End: 2024-11-11

## 2024-11-14 ENCOUNTER — WEB ENCOUNTER (OUTPATIENT)
Dept: URBAN - METROPOLITAN AREA CLINIC 113 | Facility: CLINIC | Age: 49
End: 2024-11-14

## 2024-12-05 ENCOUNTER — OFFICE VISIT (OUTPATIENT)
Dept: URBAN - METROPOLITAN AREA CLINIC 113 | Facility: CLINIC | Age: 49
End: 2024-12-05
Payer: MEDICAID

## 2024-12-05 VITALS
WEIGHT: 308 LBS | TEMPERATURE: 97.2 F | SYSTOLIC BLOOD PRESSURE: 131 MMHG | DIASTOLIC BLOOD PRESSURE: 68 MMHG | HEIGHT: 70 IN | HEART RATE: 78 BPM | RESPIRATION RATE: 18 BRPM | BODY MASS INDEX: 44.09 KG/M2

## 2024-12-05 DIAGNOSIS — R74.8 ELEVATED LIVER ENZYMES: ICD-10-CM

## 2024-12-05 DIAGNOSIS — E87.6 HYPOKALEMIA: ICD-10-CM

## 2024-12-05 DIAGNOSIS — I85.10 SECONDARY ESOPHAGEAL VARICES WITHOUT BLEEDING: ICD-10-CM

## 2024-12-05 DIAGNOSIS — K70.31 ALCOHOLIC CIRRHOSIS OF LIVER WITH ASCITES: ICD-10-CM

## 2024-12-05 PROCEDURE — 99214 OFFICE O/P EST MOD 30 MIN: CPT | Performed by: INTERNAL MEDICINE

## 2024-12-05 RX ORDER — SODIUM, POTASSIUM,MAG SULFATES 17.5-3.13G
354 ML SOLUTION, RECONSTITUTED, ORAL ORAL ONCE
Qty: 354 ML | Refills: 0 | Status: ON HOLD | COMMUNITY

## 2024-12-05 RX ORDER — PROPRANOLOL HYDROCHLORIDE 20 MG/1
1 TABLET TABLET ORAL ONCE A DAY
Status: ACTIVE | COMMUNITY

## 2024-12-05 RX ORDER — SERTRALINE HYDROCHLORIDE 100 MG/1
1 TABLET TABLET, FILM COATED ORAL ONCE A DAY
Status: ACTIVE | COMMUNITY

## 2024-12-05 RX ORDER — FUROSEMIDE 40 MG/1
1 TABLET TABLET ORAL ONCE A DAY
Qty: 30 | Refills: 3 | Status: ACTIVE | COMMUNITY

## 2024-12-05 RX ORDER — PHENTERMINE HYDROCHLORIDE 37.5 MG/1
1 CAPSULE CAPSULE ORAL ONCE A DAY
Status: ACTIVE | COMMUNITY

## 2024-12-05 RX ORDER — SPIRONOLACTONE 100 MG/1
1 TABLET TABLET, FILM COATED ORAL ONCE A DAY
Qty: 30 | Refills: 3 | Status: ACTIVE | COMMUNITY

## 2024-12-05 NOTE — HPI-ZZZTODAY'S VISIT
49-year-old male presenting for follow-up.  He was last seen in May 2024.  He does have a history of alcoholic cirrhosis with ascites and esophageal varices.  His esophageal varices were grade 1 at his previous visit.  His volume status was also improved.  He did have an upper endoscopy performed on June 24, 2024.  It did demonstrate grade 1 esophageal varices in the lower third of esophagus as well as type II G OV gastric varices.  The remainder of his exam was unremarkable aside from gastritis.  He had a colonoscopy performed in August 2021.  During that visit he was found to have grade 3 internal hemorrhoids, multiple diverticula, and the remainder was unremarkable.  He was told to repeat in 10 years.  He has lost about 18 lbs from Phenteramine. He was discussing Weygovy with his primary. He was evaluated by [facility]eroa about 2 yrs ago. He did not have any f/u afterwards. He has not had labs recently.

## 2024-12-06 LAB
A/G RATIO: 1.6
ABSOLUTE BASOPHILS: 59
ABSOLUTE EOSINOPHILS: 172
ABSOLUTE LYMPHOCYTES: 920
ABSOLUTE MONOCYTES: 339
ABSOLUTE NEUTROPHILS: 2410
ALBUMIN: 4.1
ALKALINE PHOSPHATASE: 54
ALT (SGPT): 36
AST (SGOT): 39
BASOPHILS: 1.5
BILIRUBIN, TOTAL: 2.1
BUN/CREATININE RATIO: 24
BUN: 14
CALCIUM: 9
CARBON DIOXIDE, TOTAL: 22
CHLORIDE: 109
COMMENT(S): (no result)
CREATININE: 0.59
EGFR: 120
EOSINOPHILS: 4.4
GLOBULIN, TOTAL: 2.5
GLUCOSE: 76
HEMATOCRIT: 43.5
HEMOGLOBIN: 15.8
INR: 1.3
LYMPHOCYTES: 23.6
MCH: 30.4
MCHC: 36.3
MCV: 83.8
MONOCYTES: 8.7
MPV: 11.8
NEUTROPHILS: 61.8
PLATELET COUNT: 56
POTASSIUM: 3.8
PROTEIN, TOTAL: 6.6
PT: 13.1
RDW: 14.4
RED BLOOD CELL COUNT: 5.19
SODIUM: 140
TSH W/REFLEX TO FT4: 0.64
VITAMIN D,25-OH,TOTAL,IA: 14
WHITE BLOOD CELL COUNT: 3.9

## 2025-01-16 NOTE — HPI-TODAY'S VISIT:
This is a 46-year-old male with a history of decompensated alcoholic liver disease presenting to establish care.  He recently moved back to [place] from [place].    He was previously living in [place].  He reports a longstanding history of daily alcohol use. Volume of alcohol increased over months while he was at home because of Covid quarantine.  He reports drinking a sixpack of alcoholic seltzers and 6 shots of alcohol most evenings with an increase in alcohol use on the weekends.  He developed abdominal swelling.  In early April, he lost his balance and was unable to get up from the floor.  He went to [facility] and was admitted.  He reports receiving IV fluids and vitamin K.  An infection was ruled out.  He was also prescribed steroids.  He had 2 paracenteses over 2 days during which 4 L and 11 L of fluid was removed.  He underwent a transjugular liver biopsy and was discharged before the hospital before he received results. He was on diuretics for 3 weeks after he was discharged and stopped them when he exhausted his supply.   Because of recurrent jaundice and swelling, he went to the emergency department where he had a paracentesis on 6/3.  He is experiencing leaking at the site of the paracentesis.  He denies abdominal pain.  He has occasional cramps associated with bowel movements.  At baseline, he has frequent stools and reports as many as 8 bowel movements per day.  He reports intermittent red blood per rectum on the tissue and occasionally with the bowel movement but "separate from the stool."  He has noticed occasional small clots.  He has occasional indigestion.  He otherwise denies abdominal symptoms.  He was prescribed omeprazole 20 mg daily in the emergency department and he is taking this intermittently.  He is taking lactulose once a day prescribed in the emergency department. He denies a history of changes in sleep pattern or confustion. He has been on propanolol chronically for hypertension.  He states he has not had alcohol since early April when he was admitted to the hospital in [place].  His father had alcohol-related liver disease and required a liver transplant.  He denies a family history of colon cancer.  He has not had a prior colonoscopy. n/a

## 2025-01-30 ENCOUNTER — OFFICE VISIT (OUTPATIENT)
Dept: URBAN - METROPOLITAN AREA CLINIC 113 | Facility: CLINIC | Age: 50
End: 2025-01-30

## 2025-02-06 ENCOUNTER — TELEPHONE ENCOUNTER (OUTPATIENT)
Dept: URBAN - METROPOLITAN AREA CLINIC 113 | Facility: CLINIC | Age: 50
End: 2025-02-06

## 2025-03-18 ENCOUNTER — OFFICE VISIT (OUTPATIENT)
Dept: URBAN - METROPOLITAN AREA CLINIC 113 | Facility: CLINIC | Age: 50
End: 2025-03-18
Payer: COMMERCIAL

## 2025-03-18 ENCOUNTER — LAB OUTSIDE AN ENCOUNTER (OUTPATIENT)
Dept: URBAN - METROPOLITAN AREA CLINIC 113 | Facility: CLINIC | Age: 50
End: 2025-03-18

## 2025-03-18 VITALS
SYSTOLIC BLOOD PRESSURE: 117 MMHG | WEIGHT: 315 LBS | DIASTOLIC BLOOD PRESSURE: 64 MMHG | RESPIRATION RATE: 18 BRPM | HEART RATE: 75 BPM | HEIGHT: 70 IN | TEMPERATURE: 96.8 F | BODY MASS INDEX: 45.1 KG/M2

## 2025-03-18 DIAGNOSIS — I85.10 SECONDARY ESOPHAGEAL VARICES WITHOUT BLEEDING: ICD-10-CM

## 2025-03-18 DIAGNOSIS — E87.6 HYPOKALEMIA: ICD-10-CM

## 2025-03-18 DIAGNOSIS — F10.11 H/O ALCOHOL ABUSE: ICD-10-CM

## 2025-03-18 DIAGNOSIS — E55.9 VITAMIN D DEFICIENCY: ICD-10-CM

## 2025-03-18 DIAGNOSIS — K70.31 ALCOHOLIC CIRRHOSIS OF LIVER WITH ASCITES: ICD-10-CM

## 2025-03-18 DIAGNOSIS — R74.8 ELEVATED LIVER ENZYMES: ICD-10-CM

## 2025-03-18 PROBLEM — 34713006: Status: ACTIVE | Noted: 2025-03-18

## 2025-03-18 PROCEDURE — 99214 OFFICE O/P EST MOD 30 MIN: CPT | Performed by: INTERNAL MEDICINE

## 2025-03-18 RX ORDER — ACETAMINOPHEN 325 MG/1
1 TABLET AS NEEDED TABLET, FILM COATED ORAL
Status: ACTIVE | COMMUNITY

## 2025-03-18 RX ORDER — PROPRANOLOL HYDROCHLORIDE 20 MG/1
1 TABLET TABLET ORAL ONCE A DAY
Status: ACTIVE | COMMUNITY

## 2025-03-18 RX ORDER — LEVOCETIRIZINE DIHYDROCHLORIDE 5 MG/1
1 TABLET IN THE EVENING TABLET ORAL ONCE A DAY
Status: ACTIVE | COMMUNITY

## 2025-03-18 RX ORDER — SPIRONOLACTONE 100 MG/1
1 TABLET TABLET, FILM COATED ORAL ONCE A DAY
Qty: 30 | Refills: 3 | Status: ACTIVE | COMMUNITY

## 2025-03-18 RX ORDER — PHENTERMINE HYDROCHLORIDE 37.5 MG/1
1 CAPSULE CAPSULE ORAL ONCE A DAY
Status: ACTIVE | COMMUNITY

## 2025-03-18 RX ORDER — FUROSEMIDE 40 MG/1
1 TABLET TABLET ORAL ONCE A DAY
Qty: 30 | Refills: 3 | Status: ACTIVE | COMMUNITY

## 2025-03-18 RX ORDER — SERTRALINE HYDROCHLORIDE 100 MG/1
1 TABLET TABLET, FILM COATED ORAL ONCE A DAY
Status: ACTIVE | COMMUNITY

## 2025-03-18 RX ORDER — SODIUM, POTASSIUM,MAG SULFATES 17.5-3.13G
354 ML SOLUTION, RECONSTITUTED, ORAL ORAL ONCE
Qty: 354 ML | Refills: 0 | Status: ON HOLD | COMMUNITY

## 2025-03-18 NOTE — HPI-ZZZTODAY'S VISIT
49-year-old male presenting for follow-up.  He was last seen in December 2025.  He does have a history of decompensated alcoholic cirrhosis with evidence of ascites, portal hypertensive gastropathy, grade 1 esophageal varices, and without any encephalopathy of note.  He did have evaluation with [facility] about 2 years ago.  Patient is without any abdominal complaints. No dysphagia, heartburn, regurgitation, unintentional weight loss, nausea, vomiting, hematemesis or melena. Bowels are moving regularly without blood per rectum. No complaints of bloating. No jaundice, icterus.

## 2025-06-06 ENCOUNTER — OFFICE VISIT (OUTPATIENT)
Dept: URBAN - METROPOLITAN AREA CLINIC 113 | Facility: CLINIC | Age: 50
End: 2025-06-06
Payer: COMMERCIAL

## 2025-06-06 DIAGNOSIS — R74.8 ELEVATED LIVER ENZYMES: ICD-10-CM

## 2025-06-06 DIAGNOSIS — E87.6 HYPOKALEMIA: ICD-10-CM

## 2025-06-06 DIAGNOSIS — E55.9 VITAMIN D DEFICIENCY: ICD-10-CM

## 2025-06-06 DIAGNOSIS — K70.31 ALCOHOLIC CIRRHOSIS OF LIVER WITH ASCITES: ICD-10-CM

## 2025-06-06 DIAGNOSIS — I85.10 SECONDARY ESOPHAGEAL VARICES WITHOUT BLEEDING: ICD-10-CM

## 2025-06-06 PROCEDURE — 99214 OFFICE O/P EST MOD 30 MIN: CPT | Performed by: INTERNAL MEDICINE

## 2025-06-06 RX ORDER — SERTRALINE HYDROCHLORIDE 100 MG/1
1 TABLET TABLET, FILM COATED ORAL ONCE A DAY
Status: ACTIVE | COMMUNITY

## 2025-06-06 RX ORDER — SODIUM, POTASSIUM,MAG SULFATES 17.5-3.13G
354 ML SOLUTION, RECONSTITUTED, ORAL ORAL ONCE
Qty: 354 ML | Refills: 0 | Status: ON HOLD | COMMUNITY

## 2025-06-06 RX ORDER — ACETAMINOPHEN 325 MG/1
1 TABLET AS NEEDED TABLET, FILM COATED ORAL
Status: ACTIVE | COMMUNITY

## 2025-06-06 RX ORDER — FUROSEMIDE 40 MG/1
1 TABLET TABLET ORAL ONCE A DAY
Qty: 30 | Refills: 3 | Status: ACTIVE | COMMUNITY

## 2025-06-06 RX ORDER — PROPRANOLOL HYDROCHLORIDE 20 MG/1
1 TABLET TABLET ORAL ONCE A DAY
Status: ACTIVE | COMMUNITY

## 2025-06-06 RX ORDER — PHENTERMINE HYDROCHLORIDE 37.5 MG/1
1 CAPSULE CAPSULE ORAL ONCE A DAY
Status: ACTIVE | COMMUNITY

## 2025-06-06 RX ORDER — SPIRONOLACTONE 100 MG/1
1 TABLET TABLET, FILM COATED ORAL ONCE A DAY
Qty: 30 | Refills: 3 | Status: ACTIVE | COMMUNITY

## 2025-06-06 RX ORDER — LEVOCETIRIZINE DIHYDROCHLORIDE 5 MG/1
1 TABLET IN THE EVENING TABLET ORAL ONCE A DAY
Status: ACTIVE | COMMUNITY

## 2025-06-06 NOTE — HPI-ZZZTODAY'S VISIT
50-year-old male presenting for follow-up.  He was last seen in March 2025.  He does have a history of alcoholic cirrhosis and ascites with decompensated liver issues.  He did have an abdominal ultrasound in March 2025 which was stable without any evidence of liver lesions.  He also had an upper endoscopy in June 2020 for which demonstrated grade 1 esophageal varices and type II gastric varices.  His volume status was stable.  Patient is without any abdominal complaints. No dysphagia, heartburn, regurgitation, unintentional weight loss, nausea, vomiting, hematemesis or melena. Bowels are moving regularly without blood per rectum. No complaints of bloating. No jaundice, icterus.

## 2025-06-09 LAB
A/G RATIO: 1.6
ABSOLUTE BASOPHILS: 60
ABSOLUTE EOSINOPHILS: 152
ABSOLUTE LYMPHOCYTES: 828
ABSOLUTE MONOCYTES: 324
ABSOLUTE NEUTROPHILS: 2636
AFP, SERUM, TUMOR MARKER: 6.6
ALBUMIN: 4.1
ALKALINE PHOSPHATASE: 53
ALT (SGPT): 20
AST (SGOT): 29
BASOPHILS: 1.5
BILIRUBIN, TOTAL: 2.3
BUN/CREATININE RATIO: (no result)
BUN: 11
CALCIUM: 9.7
CARBON DIOXIDE, TOTAL: 24
CHLORIDE: 110
COMMENT(S): (no result)
CREATININE: 0.65
EGFR: 116
EOSINOPHILS: 3.8
GLOBULIN, TOTAL: 2.6
GLUCOSE: 84
HEMATOCRIT: 44.1
HEMOGLOBIN: 14.8
INR: 1.2
LYMPHOCYTES: 20.7
MCH: 28.8
MCHC: 33.6
MCV: 85.8
MONOCYTES: 8.1
MPV: 11.3
NEUTROPHILS: 65.9
PLATELET COUNT: 42
POTASSIUM: 4.1
PROTEIN, TOTAL: 6.7
PT: 12.4
RDW: 14.5
RED BLOOD CELL COUNT: 5.14
SODIUM: 142
VITAMIN D,25-OH,TOTAL,IA: 56
WHITE BLOOD CELL COUNT: 4